# Patient Record
Sex: MALE | Race: WHITE | Employment: OTHER | URBAN - METROPOLITAN AREA
[De-identification: names, ages, dates, MRNs, and addresses within clinical notes are randomized per-mention and may not be internally consistent; named-entity substitution may affect disease eponyms.]

---

## 2018-09-14 ENCOUNTER — HOME HEALTH ADMISSION (OUTPATIENT)
Dept: HOME HEALTH SERVICES | Facility: HOME HEALTH | Age: 83
End: 2018-09-14
Payer: MEDICARE

## 2018-09-14 ENCOUNTER — HOME CARE VISIT (OUTPATIENT)
Dept: SCHEDULING | Facility: HOME HEALTH | Age: 83
End: 2018-09-14
Payer: MEDICARE

## 2018-09-14 VITALS
SYSTOLIC BLOOD PRESSURE: 120 MMHG | DIASTOLIC BLOOD PRESSURE: 64 MMHG | TEMPERATURE: 98.2 F | HEART RATE: 96 BPM | RESPIRATION RATE: 18 BRPM

## 2018-09-14 PROCEDURE — 3331090001 HH PPS REVENUE CREDIT

## 2018-09-14 PROCEDURE — 400013 HH SOC

## 2018-09-14 PROCEDURE — 3331090003 HH PPS REVENUE ADJ

## 2018-09-14 PROCEDURE — 3331090002 HH PPS REVENUE DEBIT

## 2018-09-14 PROCEDURE — G0151 HHCP-SERV OF PT,EA 15 MIN: HCPCS

## 2018-09-19 ENCOUNTER — HOME CARE VISIT (OUTPATIENT)
Dept: HOME HEALTH SERVICES | Facility: HOME HEALTH | Age: 83
End: 2018-09-19
Payer: MEDICARE

## 2018-10-01 ENCOUNTER — HOME HEALTH ADMISSION (OUTPATIENT)
Dept: HOME HEALTH SERVICES | Facility: HOME HEALTH | Age: 83
End: 2018-10-01
Payer: MEDICARE

## 2018-10-02 ENCOUNTER — HOME CARE VISIT (OUTPATIENT)
Dept: SCHEDULING | Facility: HOME HEALTH | Age: 83
End: 2018-10-02
Payer: MEDICARE

## 2018-10-02 VITALS
TEMPERATURE: 97.8 F | HEART RATE: 92 BPM | DIASTOLIC BLOOD PRESSURE: 70 MMHG | SYSTOLIC BLOOD PRESSURE: 140 MMHG | RESPIRATION RATE: 20 BRPM

## 2018-10-02 PROCEDURE — 3331090002 HH PPS REVENUE DEBIT

## 2018-10-02 PROCEDURE — 400013 HH SOC

## 2018-10-02 PROCEDURE — G0151 HHCP-SERV OF PT,EA 15 MIN: HCPCS

## 2018-10-02 PROCEDURE — 3331090001 HH PPS REVENUE CREDIT

## 2018-10-03 PROCEDURE — 3331090001 HH PPS REVENUE CREDIT

## 2018-10-03 PROCEDURE — 3331090002 HH PPS REVENUE DEBIT

## 2018-10-04 ENCOUNTER — HOME CARE VISIT (OUTPATIENT)
Dept: SCHEDULING | Facility: HOME HEALTH | Age: 83
End: 2018-10-04
Payer: MEDICARE

## 2018-10-04 VITALS
DIASTOLIC BLOOD PRESSURE: 80 MMHG | RESPIRATION RATE: 18 BRPM | HEART RATE: 50 BPM | TEMPERATURE: 98.2 F | SYSTOLIC BLOOD PRESSURE: 116 MMHG

## 2018-10-04 PROCEDURE — 3331090001 HH PPS REVENUE CREDIT

## 2018-10-04 PROCEDURE — 3331090002 HH PPS REVENUE DEBIT

## 2018-10-04 PROCEDURE — G0157 HHC PT ASSISTANT EA 15: HCPCS

## 2018-10-05 PROBLEM — R00.1 BRADYCARDIA: Status: ACTIVE | Noted: 2018-10-05

## 2018-10-05 PROCEDURE — 3331090001 HH PPS REVENUE CREDIT

## 2018-10-05 PROCEDURE — 3331090002 HH PPS REVENUE DEBIT

## 2018-10-06 PROCEDURE — 3331090002 HH PPS REVENUE DEBIT

## 2018-10-06 PROCEDURE — 3331090001 HH PPS REVENUE CREDIT

## 2018-10-07 PROCEDURE — 3331090002 HH PPS REVENUE DEBIT

## 2018-10-07 PROCEDURE — 3331090001 HH PPS REVENUE CREDIT

## 2018-10-08 PROCEDURE — 3331090001 HH PPS REVENUE CREDIT

## 2018-10-08 PROCEDURE — 3331090002 HH PPS REVENUE DEBIT

## 2018-10-09 ENCOUNTER — HOME CARE VISIT (OUTPATIENT)
Dept: SCHEDULING | Facility: HOME HEALTH | Age: 83
End: 2018-10-09
Payer: MEDICARE

## 2018-10-09 VITALS
DIASTOLIC BLOOD PRESSURE: 76 MMHG | RESPIRATION RATE: 18 BRPM | TEMPERATURE: 97.3 F | HEART RATE: 78 BPM | SYSTOLIC BLOOD PRESSURE: 138 MMHG

## 2018-10-09 PROCEDURE — 3331090001 HH PPS REVENUE CREDIT

## 2018-10-09 PROCEDURE — 3331090002 HH PPS REVENUE DEBIT

## 2018-10-09 PROCEDURE — G0157 HHC PT ASSISTANT EA 15: HCPCS

## 2018-10-10 ENCOUNTER — HOME CARE VISIT (OUTPATIENT)
Dept: SCHEDULING | Facility: HOME HEALTH | Age: 83
End: 2018-10-10
Payer: MEDICARE

## 2018-10-10 VITALS
HEART RATE: 87 BPM | DIASTOLIC BLOOD PRESSURE: 82 MMHG | RESPIRATION RATE: 16 BRPM | OXYGEN SATURATION: 97 % | SYSTOLIC BLOOD PRESSURE: 138 MMHG | TEMPERATURE: 97.9 F

## 2018-10-10 PROCEDURE — 3331090002 HH PPS REVENUE DEBIT

## 2018-10-10 PROCEDURE — G0152 HHCP-SERV OF OT,EA 15 MIN: HCPCS

## 2018-10-10 PROCEDURE — 3331090001 HH PPS REVENUE CREDIT

## 2018-10-11 ENCOUNTER — HOME CARE VISIT (OUTPATIENT)
Dept: SCHEDULING | Facility: HOME HEALTH | Age: 83
End: 2018-10-11
Payer: MEDICARE

## 2018-10-11 VITALS
SYSTOLIC BLOOD PRESSURE: 120 MMHG | HEART RATE: 82 BPM | TEMPERATURE: 97.4 F | DIASTOLIC BLOOD PRESSURE: 78 MMHG | RESPIRATION RATE: 18 BRPM

## 2018-10-11 PROCEDURE — 3331090001 HH PPS REVENUE CREDIT

## 2018-10-11 PROCEDURE — G0157 HHC PT ASSISTANT EA 15: HCPCS

## 2018-10-11 PROCEDURE — 3331090002 HH PPS REVENUE DEBIT

## 2018-10-12 PROCEDURE — 3331090001 HH PPS REVENUE CREDIT

## 2018-10-12 PROCEDURE — 3331090002 HH PPS REVENUE DEBIT

## 2018-10-13 PROCEDURE — 3331090001 HH PPS REVENUE CREDIT

## 2018-10-13 PROCEDURE — 3331090002 HH PPS REVENUE DEBIT

## 2018-10-14 PROCEDURE — 3331090001 HH PPS REVENUE CREDIT

## 2018-10-14 PROCEDURE — 3331090002 HH PPS REVENUE DEBIT

## 2018-10-15 PROCEDURE — 3331090001 HH PPS REVENUE CREDIT

## 2018-10-15 PROCEDURE — 3331090002 HH PPS REVENUE DEBIT

## 2018-10-16 ENCOUNTER — HOME CARE VISIT (OUTPATIENT)
Dept: SCHEDULING | Facility: HOME HEALTH | Age: 83
End: 2018-10-16
Payer: MEDICARE

## 2018-10-16 VITALS
SYSTOLIC BLOOD PRESSURE: 130 MMHG | TEMPERATURE: 97.9 F | RESPIRATION RATE: 18 BRPM | DIASTOLIC BLOOD PRESSURE: 80 MMHG | HEART RATE: 56 BPM

## 2018-10-16 PROCEDURE — 3331090002 HH PPS REVENUE DEBIT

## 2018-10-16 PROCEDURE — G0157 HHC PT ASSISTANT EA 15: HCPCS

## 2018-10-16 PROCEDURE — 3331090001 HH PPS REVENUE CREDIT

## 2018-10-17 PROCEDURE — 3331090001 HH PPS REVENUE CREDIT

## 2018-10-17 PROCEDURE — 3331090002 HH PPS REVENUE DEBIT

## 2018-10-18 ENCOUNTER — HOME CARE VISIT (OUTPATIENT)
Dept: SCHEDULING | Facility: HOME HEALTH | Age: 83
End: 2018-10-18
Payer: MEDICARE

## 2018-10-18 VITALS
RESPIRATION RATE: 18 BRPM | HEART RATE: 60 BPM | TEMPERATURE: 97.8 F | SYSTOLIC BLOOD PRESSURE: 140 MMHG | DIASTOLIC BLOOD PRESSURE: 62 MMHG

## 2018-10-18 PROCEDURE — G0157 HHC PT ASSISTANT EA 15: HCPCS

## 2018-10-18 PROCEDURE — 3331090001 HH PPS REVENUE CREDIT

## 2018-10-18 PROCEDURE — 3331090002 HH PPS REVENUE DEBIT

## 2018-10-19 PROCEDURE — 3331090002 HH PPS REVENUE DEBIT

## 2018-10-19 PROCEDURE — 3331090001 HH PPS REVENUE CREDIT

## 2018-10-20 PROCEDURE — 3331090002 HH PPS REVENUE DEBIT

## 2018-10-20 PROCEDURE — 3331090001 HH PPS REVENUE CREDIT

## 2018-10-21 PROCEDURE — 3331090002 HH PPS REVENUE DEBIT

## 2018-10-21 PROCEDURE — 3331090001 HH PPS REVENUE CREDIT

## 2018-10-22 ENCOUNTER — HOME CARE VISIT (OUTPATIENT)
Dept: SCHEDULING | Facility: HOME HEALTH | Age: 83
End: 2018-10-22
Payer: MEDICARE

## 2018-10-22 VITALS
HEART RATE: 62 BPM | RESPIRATION RATE: 22 BRPM | TEMPERATURE: 98.3 F | DIASTOLIC BLOOD PRESSURE: 76 MMHG | SYSTOLIC BLOOD PRESSURE: 134 MMHG

## 2018-10-22 PROCEDURE — G0151 HHCP-SERV OF PT,EA 15 MIN: HCPCS

## 2018-10-22 PROCEDURE — 3331090002 HH PPS REVENUE DEBIT

## 2018-10-22 PROCEDURE — 3331090001 HH PPS REVENUE CREDIT

## 2018-10-30 PROBLEM — I47.29 NSVT (NONSUSTAINED VENTRICULAR TACHYCARDIA): Status: ACTIVE | Noted: 2018-10-30

## 2019-01-08 ENCOUNTER — HOSPITAL ENCOUNTER (OUTPATIENT)
Dept: SLEEP MEDICINE | Age: 84
Discharge: HOME | End: 2019-01-08
Payer: MEDICARE

## 2019-01-08 PROCEDURE — 95811 POLYSOM 6/>YRS CPAP 4/> PARM: CPT

## 2019-01-10 ENCOUNTER — HOSPITAL ENCOUNTER (OUTPATIENT)
Dept: GENERAL RADIOLOGY | Age: 84
Discharge: HOME | End: 2019-01-10
Payer: MEDICARE

## 2019-01-10 DIAGNOSIS — J61 ASBESTOSIS (HCC): ICD-10-CM

## 2019-01-10 PROCEDURE — 71046 X-RAY EXAM CHEST 2 VIEWS: CPT

## 2019-02-18 ENCOUNTER — HOSPITAL ENCOUNTER (OUTPATIENT)
Dept: SLEEP MEDICINE | Age: 84
Discharge: HOME | End: 2019-02-18
Payer: MEDICARE

## 2019-02-18 PROCEDURE — 95811 POLYSOM 6/>YRS CPAP 4/> PARM: CPT

## 2019-02-26 PROBLEM — G47.33 OSA (OBSTRUCTIVE SLEEP APNEA): Status: ACTIVE | Noted: 2019-02-26

## 2019-02-26 PROBLEM — H91.90 HOH (HARD OF HEARING): Status: ACTIVE | Noted: 2019-02-26

## 2019-04-22 PROBLEM — Z78.9 DIFFICULTY WITH CPAP FULL FACE MASK USE: Status: ACTIVE | Noted: 2019-04-22

## 2019-04-26 PROBLEM — K86.2 PANCREATIC CYST: Status: ACTIVE | Noted: 2019-04-26

## 2019-10-16 ENCOUNTER — HOSPITAL ENCOUNTER (OUTPATIENT)
Dept: LAB | Age: 84
Discharge: HOME | End: 2019-10-16

## 2019-10-16 PROCEDURE — 88305 TISSUE EXAM BY PATHOLOGIST: CPT

## 2019-10-16 PROCEDURE — 88313 SPECIAL STAINS GROUP 2: CPT

## 2020-08-10 PROBLEM — I48.0 PAROXYSMAL ATRIAL FIBRILLATION (HCC): Status: ACTIVE | Noted: 2020-08-10

## 2020-10-26 PROBLEM — M17.10 ARTHRITIS OF KNEE: Status: ACTIVE | Noted: 2020-10-26

## 2020-11-10 PROBLEM — R22.31 MASS OF ARM, RIGHT: Status: ACTIVE | Noted: 2020-11-10

## 2021-01-08 ENCOUNTER — HOSPITAL ENCOUNTER (OUTPATIENT)
Age: 86
Setting detail: OBSERVATION
Discharge: HOME HEALTH | End: 2021-01-10
Attending: EMERGENCY MEDICINE | Admitting: HOSPITALIST
Payer: MEDICARE

## 2021-01-08 ENCOUNTER — APPOINTMENT (OUTPATIENT)
Dept: CT IMAGING | Age: 86
End: 2021-01-08
Attending: EMERGENCY MEDICINE
Payer: MEDICARE

## 2021-01-08 DIAGNOSIS — I63.9 ACUTE CVA (CEREBROVASCULAR ACCIDENT) (HCC): ICD-10-CM

## 2021-01-08 DIAGNOSIS — I10 HYPERTENSION, UNSPECIFIED TYPE: ICD-10-CM

## 2021-01-08 DIAGNOSIS — G45.9 TIA (TRANSIENT ISCHEMIC ATTACK): Primary | ICD-10-CM

## 2021-01-08 DIAGNOSIS — R47.01 EXPRESSIVE APHASIA: ICD-10-CM

## 2021-01-08 DIAGNOSIS — I48.91 ATRIAL FIBRILLATION, UNSPECIFIED TYPE (HCC): ICD-10-CM

## 2021-01-08 LAB
ANION GAP SERPL CALC-SCNC: 5 MMOL/L (ref 7–16)
ATRIAL RATE: 111 BPM
BASOPHILS # BLD: 0.1 K/UL (ref 0–0.2)
BASOPHILS NFR BLD: 1 % (ref 0–2)
BUN SERPL-MCNC: 17 MG/DL (ref 8–23)
CALCIUM SERPL-MCNC: 9.1 MG/DL (ref 8.3–10.4)
CALCULATED P AXIS, ECG09: 0 DEGREES
CALCULATED T AXIS, ECG11: -24 DEGREES
CHLORIDE SERPL-SCNC: 105 MMOL/L (ref 98–107)
CHOLEST SERPL-MCNC: 132 MG/DL
CO2 SERPL-SCNC: 29 MMOL/L (ref 21–32)
CREAT SERPL-MCNC: 1.16 MG/DL (ref 0.8–1.5)
DIAGNOSIS, 93000: NORMAL
DIFFERENTIAL METHOD BLD: ABNORMAL
EOSINOPHIL # BLD: 0.1 K/UL (ref 0–0.8)
EOSINOPHIL NFR BLD: 2 % (ref 0.5–7.8)
ERYTHROCYTE [DISTWIDTH] IN BLOOD BY AUTOMATED COUNT: 14.3 % (ref 11.9–14.6)
EST. AVERAGE GLUCOSE BLD GHB EST-MCNC: 111 MG/DL
GLUCOSE BLD STRIP.AUTO-MCNC: 119 MG/DL (ref 65–100)
GLUCOSE SERPL-MCNC: 117 MG/DL (ref 65–100)
HBA1C MFR BLD: 5.5 % (ref 4.2–6.3)
HCT VFR BLD AUTO: 42.1 % (ref 41.1–50.3)
HDLC SERPL-MCNC: 65 MG/DL (ref 40–60)
HDLC SERPL: 2 {RATIO}
HGB BLD-MCNC: 14.1 G/DL (ref 13.6–17.2)
IMM GRANULOCYTES # BLD AUTO: 0 K/UL (ref 0–0.5)
IMM GRANULOCYTES NFR BLD AUTO: 0 % (ref 0–5)
INR BLD: 1.4 (ref 0.9–1.2)
INR PPP: 1.3
LDLC SERPL CALC-MCNC: 57.4 MG/DL
LIPID PROFILE,FLP: ABNORMAL
LYMPHOCYTES # BLD: 1.8 K/UL (ref 0.5–4.6)
LYMPHOCYTES NFR BLD: 29 % (ref 13–44)
MCH RBC QN AUTO: 32.3 PG (ref 26.1–32.9)
MCHC RBC AUTO-ENTMCNC: 33.5 G/DL (ref 31.4–35)
MCV RBC AUTO: 96.3 FL (ref 79.6–97.8)
MONOCYTES # BLD: 0.5 K/UL (ref 0.1–1.3)
MONOCYTES NFR BLD: 8 % (ref 4–12)
NEUTS SEG # BLD: 3.7 K/UL (ref 1.7–8.2)
NEUTS SEG NFR BLD: 59 % (ref 43–78)
NRBC # BLD: 0 K/UL (ref 0–0.2)
PLATELET # BLD AUTO: 144 K/UL (ref 150–450)
PMV BLD AUTO: 11 FL (ref 9.4–12.3)
POTASSIUM SERPL-SCNC: 3.6 MMOL/L (ref 3.5–5.1)
PROTHROMBIN TIME: 16.3 SEC (ref 12.5–14.7)
PT BLD: 16 SECS (ref 9.6–11.6)
Q-T INTERVAL, ECG07: 274 MS
QRS DURATION, ECG06: 80 MS
QTC CALCULATION (BEZET), ECG08: 407 MS
RBC # BLD AUTO: 4.37 M/UL (ref 4.23–5.6)
SODIUM SERPL-SCNC: 139 MMOL/L (ref 138–145)
TRIGL SERPL-MCNC: 48 MG/DL (ref 35–150)
TROPONIN-HIGH SENSITIVITY: 14 PG/ML (ref 0–14)
TSH SERPL DL<=0.005 MIU/L-ACNC: 1.91 UIU/ML (ref 0.36–3.74)
VENTRICULAR RATE, ECG03: 133 BPM
VLDLC SERPL CALC-MCNC: 9.6 MG/DL (ref 6–23)
WBC # BLD AUTO: 6.2 K/UL (ref 4.3–11.1)

## 2021-01-08 PROCEDURE — 85610 PROTHROMBIN TIME: CPT

## 2021-01-08 PROCEDURE — C8929 TTE W OR WO FOL WCON,DOPPLER: HCPCS

## 2021-01-08 PROCEDURE — 96372 THER/PROPH/DIAG INJ SC/IM: CPT

## 2021-01-08 PROCEDURE — 84443 ASSAY THYROID STIM HORMONE: CPT

## 2021-01-08 PROCEDURE — 86580 TB INTRADERMAL TEST: CPT | Performed by: HOSPITALIST

## 2021-01-08 PROCEDURE — 99285 EMERGENCY DEPT VISIT HI MDM: CPT

## 2021-01-08 PROCEDURE — 80048 BASIC METABOLIC PNL TOTAL CA: CPT

## 2021-01-08 PROCEDURE — 93005 ELECTROCARDIOGRAM TRACING: CPT | Performed by: EMERGENCY MEDICINE

## 2021-01-08 PROCEDURE — 99205 OFFICE O/P NEW HI 60 MIN: CPT | Performed by: PSYCHIATRY & NEUROLOGY

## 2021-01-08 PROCEDURE — 2709999900 HC NON-CHARGEABLE SUPPLY

## 2021-01-08 PROCEDURE — 74011000250 HC RX REV CODE- 250: Performed by: EMERGENCY MEDICINE

## 2021-01-08 PROCEDURE — 80061 LIPID PANEL: CPT

## 2021-01-08 PROCEDURE — 83036 HEMOGLOBIN GLYCOSYLATED A1C: CPT

## 2021-01-08 PROCEDURE — 99218 HC RM OBSERVATION: CPT

## 2021-01-08 PROCEDURE — 74011250636 HC RX REV CODE- 250/636: Performed by: HOSPITALIST

## 2021-01-08 PROCEDURE — 65390000012 HC CONDITION CODE 44 OBSERVATION

## 2021-01-08 PROCEDURE — 74011000302 HC RX REV CODE- 302: Performed by: HOSPITALIST

## 2021-01-08 PROCEDURE — 82962 GLUCOSE BLOOD TEST: CPT

## 2021-01-08 PROCEDURE — 84484 ASSAY OF TROPONIN QUANT: CPT

## 2021-01-08 PROCEDURE — 70450 CT HEAD/BRAIN W/O DYE: CPT

## 2021-01-08 PROCEDURE — 85025 COMPLETE CBC W/AUTO DIFF WBC: CPT

## 2021-01-08 PROCEDURE — 74011000250 HC RX REV CODE- 250: Performed by: HOSPITALIST

## 2021-01-08 PROCEDURE — 96374 THER/PROPH/DIAG INJ IV PUSH: CPT

## 2021-01-08 PROCEDURE — 74011250637 HC RX REV CODE- 250/637: Performed by: HOSPITALIST

## 2021-01-08 RX ORDER — METOPROLOL TARTRATE 5 MG/5ML
5 INJECTION INTRAVENOUS ONCE
Status: CANCELLED | OUTPATIENT
Start: 2021-01-08 | End: 2021-01-09

## 2021-01-08 RX ORDER — SODIUM CHLORIDE 0.9 % (FLUSH) 0.9 %
5-40 SYRINGE (ML) INJECTION EVERY 8 HOURS
Status: DISCONTINUED | OUTPATIENT
Start: 2021-01-08 | End: 2021-01-10 | Stop reason: HOSPADM

## 2021-01-08 RX ORDER — GUAIFENESIN 100 MG/5ML
81 LIQUID (ML) ORAL DAILY
Status: DISCONTINUED | OUTPATIENT
Start: 2021-01-09 | End: 2021-01-09

## 2021-01-08 RX ORDER — METOPROLOL TARTRATE 25 MG/1
25 TABLET, FILM COATED ORAL 2 TIMES DAILY
Status: DISCONTINUED | OUTPATIENT
Start: 2021-01-08 | End: 2021-01-10 | Stop reason: HOSPADM

## 2021-01-08 RX ORDER — ACETAMINOPHEN 325 MG/1
650 TABLET ORAL
Status: DISCONTINUED | OUTPATIENT
Start: 2021-01-08 | End: 2021-01-10 | Stop reason: HOSPADM

## 2021-01-08 RX ORDER — ASPIRIN 81 MG/1
81 TABLET ORAL DAILY
Status: DISCONTINUED | OUTPATIENT
Start: 2021-01-09 | End: 2021-01-08 | Stop reason: SDUPTHER

## 2021-01-08 RX ORDER — ONDANSETRON 2 MG/ML
4 INJECTION INTRAMUSCULAR; INTRAVENOUS
Status: DISCONTINUED | OUTPATIENT
Start: 2021-01-08 | End: 2021-01-10 | Stop reason: HOSPADM

## 2021-01-08 RX ORDER — LABETALOL HYDROCHLORIDE 5 MG/ML
20 INJECTION, SOLUTION INTRAVENOUS
Status: COMPLETED | OUTPATIENT
Start: 2021-01-08 | End: 2021-01-08

## 2021-01-08 RX ORDER — METOPROLOL TARTRATE 5 MG/5ML
5 INJECTION INTRAVENOUS ONCE
Status: DISCONTINUED | OUTPATIENT
Start: 2021-01-08 | End: 2021-01-08

## 2021-01-08 RX ORDER — FINASTERIDE 5 MG/1
5 TABLET, FILM COATED ORAL DAILY
Status: DISCONTINUED | OUTPATIENT
Start: 2021-01-09 | End: 2021-01-10 | Stop reason: HOSPADM

## 2021-01-08 RX ORDER — SODIUM CHLORIDE 9 MG/ML
50 INJECTION, SOLUTION INTRAVENOUS CONTINUOUS
Status: DISPENSED | OUTPATIENT
Start: 2021-01-08 | End: 2021-01-09

## 2021-01-08 RX ORDER — ATORVASTATIN CALCIUM 80 MG/1
80 TABLET, FILM COATED ORAL DAILY
Status: DISCONTINUED | OUTPATIENT
Start: 2021-01-09 | End: 2021-01-10 | Stop reason: HOSPADM

## 2021-01-08 RX ORDER — CLOPIDOGREL BISULFATE 75 MG/1
300 TABLET ORAL DAILY
Status: DISCONTINUED | OUTPATIENT
Start: 2021-01-08 | End: 2021-01-08 | Stop reason: CLARIF

## 2021-01-08 RX ORDER — SODIUM CHLORIDE 0.9 % (FLUSH) 0.9 %
5-40 SYRINGE (ML) INJECTION AS NEEDED
Status: DISCONTINUED | OUTPATIENT
Start: 2021-01-08 | End: 2021-01-10 | Stop reason: HOSPADM

## 2021-01-08 RX ORDER — METOPROLOL TARTRATE 25 MG/1
25 TABLET, FILM COATED ORAL 2 TIMES DAILY
Status: CANCELLED | OUTPATIENT
Start: 2021-01-08

## 2021-01-08 RX ORDER — ENOXAPARIN SODIUM 100 MG/ML
40 INJECTION SUBCUTANEOUS EVERY 24 HOURS
Status: DISCONTINUED | OUTPATIENT
Start: 2021-01-08 | End: 2021-01-09

## 2021-01-08 RX ORDER — TAMSULOSIN HYDROCHLORIDE 0.4 MG/1
0.4 CAPSULE ORAL DAILY
Status: DISCONTINUED | OUTPATIENT
Start: 2021-01-09 | End: 2021-01-10 | Stop reason: HOSPADM

## 2021-01-08 RX ADMIN — TUBERCULIN PURIFIED PROTEIN DERIVATIVE 5 UNITS: 5 INJECTION, SOLUTION INTRADERMAL at 18:54

## 2021-01-08 RX ADMIN — METOPROLOL TARTRATE 25 MG: 25 TABLET, FILM COATED ORAL at 18:54

## 2021-01-08 RX ADMIN — LABETALOL HYDROCHLORIDE 20 MG: 5 INJECTION INTRAVENOUS at 11:12

## 2021-01-08 RX ADMIN — SODIUM CHLORIDE 50 ML/HR: 900 INJECTION, SOLUTION INTRAVENOUS at 18:55

## 2021-01-08 RX ADMIN — Medication 10 ML: at 22:11

## 2021-01-08 RX ADMIN — PERFLUTREN 1 ML: 6.52 INJECTION, SUSPENSION INTRAVENOUS at 13:50

## 2021-01-08 RX ADMIN — ENOXAPARIN SODIUM 40 MG: 40 INJECTION SUBCUTANEOUS at 22:14

## 2021-01-08 NOTE — Clinical Note
Status[de-identified] INPATIENT [101]   Type of Bed: Remote Telemetry [29]   Inpatient Hospitalization Certified Necessary for the Following Reasons: 9.  Other (further clarification in H&P documentation)   Admitting Diagnosis: TIA (transient ischemic attack) [801120]   Admitting Physician: [facility] [address], [facility]   Attending Physician: Ben Murray   Estimated Length of Stay: 2 Midnights   Discharge Plan[de-identified] Home with Office Follow-up

## 2021-01-08 NOTE — ED NOTES
TRANSFER - OUT REPORT:    Verbal report given to thalia kwan(name) on Kuldeep Alvarez  being transferred to 7th floor(unit) for routine progression of care       Report consisted of patients Situation, Background, Assessment and   Recommendations(SBAR). Information from the following report(s) SBAR, Kardex, ED Summary, Intake/Output, MAR and Recent Results was reviewed with the receiving nurse. Lines:   Peripheral IV 01/08/21 Left Antecubital (Active)   Site Assessment Clean, dry, & intact 01/08/21 1057   Phlebitis Assessment 0 01/08/21 1057   Infiltration Assessment 0 01/08/21 1057   Dressing Status Clean, dry, & intact 01/08/21 1057   Hub Color/Line Status Green 01/08/21 1057        Opportunity for questions and clarification was provided.       Patient transported with:  transport

## 2021-01-08 NOTE — ED PROVIDER NOTES
EMS reports patient with history of atrial fibrillation not on any blood thinner. Woke at 6:30 AM and had breakfast in his father-in-law's week. When his son-in-law came by around 8 he tried to speak with him and could not get his words out. This lasted for maybe a couple minutes. EMS was called out and patient brought here for possible TIA. On EMS arrival patient was symptom-free. Has remained symptom-free to the ER. Denies any headache or blurry vision. No Chest pain or shortness of breath. Is hypertensive with rapid A. fib in the ER room. The history is provided by the patient. No  was used. Aphasia  This is a new problem. Episode onset: 0800. The problem has been resolved. There was no focality noted. Primary symptoms include speech difficulty. Pertinent negatives include no focal weakness, no loss of sensation, no slurred speech, no movement disorder, no visual change, no mental status change, no unresponsiveness and no disorientation. There has been no fever. Pertinent negatives include no shortness of breath, no chest pain, no vomiting, no altered mental status, no confusion, no headaches, no nausea, no bowel incontinence and no bladder incontinence.         Past Medical History:   Diagnosis Date    Abnormal chest x-ray 2013    Lung shadow    Asbestos exposure 2013    Asbestos exposure     hx of as     Benign prostatic hypertrophy 2013    Hyperlipidemia 2013    Sleep apnea     Unspecified essential hypertension 2013       Past Surgical History:   Procedure Laterality Date    HX ORTHOPAEDIC      wrist fx    HX ORTHOPAEDIC      kirstin in femur screw in hip    HX OTHER SURGICAL      Bilateral wrist surgery    HX TONSILLECTOMY           Family History:   Problem Relation Age of Onset    Heart Attack Son 40         secondary to MI    Hypertension Mother     Hypertension Father     Diabetes Father     Stroke Father     Cancer Brother        Social History     Socioeconomic History    Marital status:      Spouse name: Not on file    Number of children: Not on file    Years of education: Not on file    Highest education level: Not on file   Occupational History    Not on file   Social Needs    Financial resource strain: Not on file    Food insecurity     Worry: Not on file     Inability: Not on file    Transportation needs     Medical: Not on file     Non-medical: Not on file   Tobacco Use    Smoking status: Never Smoker    Smokeless tobacco: Never Used   Substance and Sexual Activity    Alcohol use: Yes     Comment: 1 drink daily    Drug use: Not on file    Sexual activity: Not on file   Lifestyle    Physical activity     Days per week: Not on file     Minutes per session: Not on file    Stress: Not on file   Relationships    Social connections     Talks on phone: Not on file     Gets together: Not on file     Attends Anabaptism service: Not on file     Active member of club or organization: Not on file     Attends meetings of clubs or organizations: Not on file     Relationship status: Not on file    Intimate partner violence     Fear of current or ex partner: Not on file     Emotionally abused: Not on file     Physically abused: Not on file     Forced sexual activity: Not on file   Other Topics Concern    Not on file   Social History Narrative    Not on file         ALLERGIES: Patient has no known allergies. Review of Systems   Constitutional: Negative for chills and fever. HENT: Negative for rhinorrhea and sore throat. Eyes: Negative for pain, redness and visual disturbance. Respiratory: Negative for chest tightness, shortness of breath and wheezing. Cardiovascular: Negative for chest pain and leg swelling. Gastrointestinal: Negative for abdominal pain, bowel incontinence, diarrhea, nausea and vomiting. Genitourinary: Negative for bladder incontinence, dysuria and hematuria.    Musculoskeletal: Negative for back pain, gait problem, neck pain and neck stiffness. Skin: Negative for color change and rash. Neurological: Positive for speech difficulty. Negative for dizziness, focal weakness, facial asymmetry, weakness, numbness and headaches. Psychiatric/Behavioral: Negative for confusion. Vitals:    01/08/21 1046   BP: (!) 194/118   Pulse: (!) 108   Resp: 18   Temp: 98.7 °F (37.1 °C)   SpO2: 97%   Weight: 93.4 kg (206 lb)   Height: 5' 7\" (1.702 m)            Physical Exam  Constitutional:       Appearance: Normal appearance. He is well-developed. HENT:      Head: Normocephalic and atraumatic. Eyes:      Extraocular Movements: Extraocular movements intact. Pupils: Pupils are equal, round, and reactive to light. Neck:      Musculoskeletal: Normal range of motion and neck supple. Cardiovascular:      Rate and Rhythm: Normal rate and regular rhythm. Pulmonary:      Effort: Pulmonary effort is normal.      Breath sounds: Normal breath sounds. Abdominal:      General: Bowel sounds are normal.      Palpations: Abdomen is soft. Tenderness: There is no abdominal tenderness. Musculoskeletal: Normal range of motion. Skin:     General: Skin is warm and dry. Neurological:      Mental Status: He is alert and oriented to person, place, and time. Cranial Nerves: No cranial nerve deficit. Motor: Weakness (LLE drift) present. MDM  Number of Diagnoses or Management Options  Diagnosis management comments: Discussed with neuro and will hold off on CTA and CT brain perfusion. CT no acute. Will treat blood pressure and A fib and admit for further eval.     Blood pressure and heart rate improved. Will admit.         Amount and/or Complexity of Data Reviewed  Clinical lab tests: ordered and reviewed  Tests in the radiology section of CPT®: ordered and reviewed    Patient Progress  Patient progress: stable         Procedures        EKG: nonspecific ST and T waves changes, atrial fibrillation, rate 133. CT CODE NEURO HEAD WO CONTRAST (Final result)  Result time 01/08/21 11:12:10  Final result by Savanna Whitaker DO (01/08/21 11:12:10)                Impression:    Impression:     1. Findings most compatible with moderate chronic small vessel ischemic changes. 2. Otherwise unremarkable unenhanced CT scan of the brain. Narrative:    CT head without contrast     History: patient with acute neuro changes.  Left-sided weakness, aphasia     Technique: 5mm axial images were obtained from the skull base to the vertex   without contrast. Radiation dose reduction techniques were used for this study:   Our CT scanners use one or all of the following: Automated exposure control,   adjustment of the mA and/or kVp according to patient's size, iterative   reconstruction. Comparison: None. Findings: The ventricles and sulci are prominent but felt to be appropriate for   age. There are no extra-axial fluid collections. No evidence of acute   intraparenchymal hemorrhage or mass effect is identified. Patchy areas of   decreased attenuation are noted within the supratentorial white matter. These   are nonspecific findings but would be most compatible with moderate chronic   small vessel ischemic changes. There is no evidence to suggest an acute major   territorial infarct. The bony calvarium is intact.  The visualized mastoid air cells and paranasal   sinuses are well pneumatized and aerated.                          Results Include:    Recent Results (from the past 24 hour(s))   CBC WITH AUTOMATED DIFF    Collection Time: 01/08/21 10:52 AM   Result Value Ref Range    WBC 6.2 4.3 - 11.1 K/uL    RBC 4.37 4.23 - 5.6 M/uL    HGB 14.1 13.6 - 17.2 g/dL    HCT 42.1 41.1 - 50.3 %    MCV 96.3 79.6 - 97.8 FL    MCH 32.3 26.1 - 32.9 PG    MCHC 33.5 31.4 - 35.0 g/dL    RDW 14.3 11.9 - 14.6 %    PLATELET 766 (L) 909 - 450 K/uL    MPV 11.0 9.4 - 12.3 FL    ABSOLUTE NRBC 0.00 0.0 - 0.2 K/uL    DF AUTOMATED      NEUTROPHILS 59 43 - 78 %    LYMPHOCYTES 29 13 - 44 %    MONOCYTES 8 4.0 - 12.0 %    EOSINOPHILS 2 0.5 - 7.8 %    BASOPHILS 1 0.0 - 2.0 %    IMMATURE GRANULOCYTES 0 0.0 - 5.0 %    ABS. NEUTROPHILS 3.7 1.7 - 8.2 K/UL    ABS. LYMPHOCYTES 1.8 0.5 - 4.6 K/UL    ABS. MONOCYTES 0.5 0.1 - 1.3 K/UL    ABS. EOSINOPHILS 0.1 0.0 - 0.8 K/UL    ABS. BASOPHILS 0.1 0.0 - 0.2 K/UL    ABS. IMM.  GRANS. 0.0 0.0 - 0.5 K/UL   METABOLIC PANEL, BASIC    Collection Time: 01/08/21 10:52 AM   Result Value Ref Range    Sodium 139 138 - 145 mmol/L    Potassium 3.6 3.5 - 5.1 mmol/L    Chloride 105 98 - 107 mmol/L    CO2 29 21 - 32 mmol/L    Anion gap 5 (L) 7 - 16 mmol/L    Glucose 117 (H) 65 - 100 mg/dL    BUN 17 8 - 23 MG/DL    Creatinine 1.16 0.8 - 1.5 MG/DL    GFR est AA >60 >60 ml/min/1.73m2    GFR est non-AA >60 >60 ml/min/1.73m2    Calcium 9.1 8.3 - 10.4 MG/DL   PROTHROMBIN TIME + INR    Collection Time: 01/08/21 10:52 AM   Result Value Ref Range    Prothrombin time 16.3 (H) 12.5 - 14.7 sec    INR 1.3     TROPONIN-HIGH SENSITIVITY    Collection Time: 01/08/21 10:52 AM   Result Value Ref Range    Troponin-High Sensitivity 14.0 0 - 14 pg/mL   GLUCOSE, POC    Collection Time: 01/08/21 10:53 AM   Result Value Ref Range    Glucose (POC) 119 (H) 65 - 100 mg/dL   POC PT/INR    Collection Time: 01/08/21 10:54 AM   Result Value Ref Range    Prothrombin time (POC) 16.0 (H) 9.6 - 11.6 SECS    INR (POC) 1.4 (H) 0.9 - 1.2

## 2021-01-08 NOTE — ED TRIAGE NOTES
Pt arrives via ems from home with mask in place. Ems called out for aphasia. Daughter reported he had difficulty with expressing words. States it started at roughly 0800. Reports it lasted a couple of minutes. Daughter denied any slurred speech, facial droop or weakness. Ems reports [place] negative and race 0. afib en route 100-130 with hx of afib. bp 170s en route. bgl 197 with ems, afebrile at 98.8 and 97% ra.

## 2021-01-08 NOTE — H&P
INTERNAL MEDICINE H&P/CONSULT    Subjective:     81 yo male w/ hx of HTN, LAZ and atrial fibrillation not on any blood thinner. Woke at 6:30 AM and had breakfast in his father-in-law's week. When his son-in-law came by around 8 he tried to speak with him and could not get his words out. This lasted for maybe a couple minutes. EMS was called out and patient brought here for possible TIA. On EMS arrival patient was symptom-free. Has remained symptom-free to the ER. Denies any headache or blurry vision. No Chest pain or shortness of breath. Is hypertensive with rapid A. fib in the ER room. On further questioning, his symptom lasted about 1 hour and resolved. No other symptoms reported. He was seen in ED by Neurology. Past Medical History:   Diagnosis Date    Abnormal chest x-ray 5/6/2013    Lung shadow    Asbestos exposure 5/6/2013    Asbestos exposure     hx of as     Benign prostatic hypertrophy 5/6/2013    Hyperlipidemia 5/6/2013    Sleep apnea     Unspecified essential hypertension 5/6/2013      Past Surgical History:   Procedure Laterality Date    HX ORTHOPAEDIC      wrist fx    HX ORTHOPAEDIC      kirstin in femur screw in hip    HX OTHER SURGICAL      Bilateral wrist surgery    HX TONSILLECTOMY        Prior to Admission medications    Medication Sig Start Date End Date Taking? Authorizing Provider   OTHER Walker with wheels and a seat 10/26/20   Teagan Rodriguez MD   vit C/E/Zn/coppr/lutein/zeaxan (PRESERVISION AREDS-2 PO) Take  by mouth. Provider, Historical   metoprolol succinate (TOPROL-XL) 25 mg XL tablet Take 1 Tab by mouth two (2) times a day. 8/10/20   Pinkynile Castano DO   finasteride (PROSCAR) 5 mg tablet Take 1 Tab by mouth daily. 6/29/20   Blondell Reap, FNP   multivitamin (ONE A DAY) tablet Take 1 Tab by mouth daily.     Provider, Historical   tamsulosin (FLOMAX) 0.4 mg capsule TAKE 1 CAPSULE BY MOUTH EVERY DAY FOR 14 DAYS 5/27/20   Provider, Historical vitamin B complex (Super B-50 Complex) capsule     Provider, Historical   co-enzyme Q-10 (CO Q-10) 100 mg capsule  6/10/15   Provider, Historical   simvastatin (ZOCOR) 20 mg tablet Take 1 Tab by mouth nightly. 10/22/19   Hossein Rodriguez MD   aspirin delayed-release 81 mg tablet Take  by mouth daily. Provider, Historical   turmeric root extract 500 mg cap Take  by mouth. Provider, Historical   cholecalciferol (VITAMIN D3) 1,000 unit cap Take  by mouth daily. Provider, Historical   loratadine 10 mg cap Take  by mouth. Provider, Historical   Omega-3-DHA-EPA-Fish Oil 1,200 (144-216) mg cap Take 1 Cap by mouth three (3) times daily. Provider, Historical     No Known Allergies   Social History     Tobacco Use    Smoking status: Never Smoker    Smokeless tobacco: Never Used   Substance Use Topics    Alcohol use: Yes     Comment: 1 drink daily        Family History:  HTN    Review of Systems   A comprehensive review of systems was negative except for that written in the HPI. Objective: Intake / Output:  No intake/output data recorded. No intake/output data recorded. Physical Exam:  Visit Vitals  BP (!) 160/90   Pulse 75   Temp 98.7 °F (37.1 °C)   Resp 15   Ht 5' 7\" (1.702 m)   Wt 93.4 kg (206 lb)   SpO2 96%   BMI 32.26 kg/m²     General appearance: awake, alert, cooperative, obese, mild distress, appears stated age   Head: Normocephalic, without obvious abnormality, atraumatic  Back: symmetric, no curvature. ROM normal. No CVA tenderness. Lungs: clear to auscultation bilaterally   Heart: irregular irreg  Extremities: atraumatic, no cyanosis - Bilateral lower limbs edema none. Skin: No rashes or ulceration. Neurologic:  Cranial nerves 2-12 intact. Motor 5/5 in 4 limbs. No sensory deficits. No visual field defects. No dysmetria or nystagmus. DTR +2. Babiniski's reflex negative. Romberg's and gait tests are deferred at this time.     ECG: sinus rhythm     Data Review (Labs):   Recent Results (from the past 24 hour(s))   CBC WITH AUTOMATED DIFF    Collection Time: 01/08/21 10:52 AM   Result Value Ref Range    WBC 6.2 4.3 - 11.1 K/uL    RBC 4.37 4.23 - 5.6 M/uL    HGB 14.1 13.6 - 17.2 g/dL    HCT 42.1 41.1 - 50.3 %    MCV 96.3 79.6 - 97.8 FL    MCH 32.3 26.1 - 32.9 PG    MCHC 33.5 31.4 - 35.0 g/dL    RDW 14.3 11.9 - 14.6 %    PLATELET 975 (L) 056 - 450 K/uL    MPV 11.0 9.4 - 12.3 FL    ABSOLUTE NRBC 0.00 0.0 - 0.2 K/uL    DF AUTOMATED      NEUTROPHILS 59 43 - 78 %    LYMPHOCYTES 29 13 - 44 %    MONOCYTES 8 4.0 - 12.0 %    EOSINOPHILS 2 0.5 - 7.8 %    BASOPHILS 1 0.0 - 2.0 %    IMMATURE GRANULOCYTES 0 0.0 - 5.0 %    ABS. NEUTROPHILS 3.7 1.7 - 8.2 K/UL    ABS. LYMPHOCYTES 1.8 0.5 - 4.6 K/UL    ABS. MONOCYTES 0.5 0.1 - 1.3 K/UL    ABS. EOSINOPHILS 0.1 0.0 - 0.8 K/UL    ABS. BASOPHILS 0.1 0.0 - 0.2 K/UL    ABS. IMM.  GRANS. 0.0 0.0 - 0.5 K/UL   METABOLIC PANEL, BASIC    Collection Time: 01/08/21 10:52 AM   Result Value Ref Range    Sodium 139 138 - 145 mmol/L    Potassium 3.6 3.5 - 5.1 mmol/L    Chloride 105 98 - 107 mmol/L    CO2 29 21 - 32 mmol/L    Anion gap 5 (L) 7 - 16 mmol/L    Glucose 117 (H) 65 - 100 mg/dL    BUN 17 8 - 23 MG/DL    Creatinine 1.16 0.8 - 1.5 MG/DL    GFR est AA >60 >60 ml/min/1.73m2    GFR est non-AA >60 >60 ml/min/1.73m2    Calcium 9.1 8.3 - 10.4 MG/DL   PROTHROMBIN TIME + INR    Collection Time: 01/08/21 10:52 AM   Result Value Ref Range    Prothrombin time 16.3 (H) 12.5 - 14.7 sec    INR 1.3     TROPONIN-HIGH SENSITIVITY    Collection Time: 01/08/21 10:52 AM   Result Value Ref Range    Troponin-High Sensitivity 14.0 0 - 14 pg/mL   HEMOGLOBIN A1C WITH EAG    Collection Time: 01/08/21 10:52 AM   Result Value Ref Range    Hemoglobin A1c 5.5 4.20 - 6.30 %    Est. average glucose 111 mg/dL   LIPID PANEL    Collection Time: 01/08/21 10:52 AM   Result Value Ref Range    LIPID PROFILE          Cholesterol, total 132 <200 MG/DL    Triglyceride 48 35 - 150 MG/DL    HDL Cholesterol 65 (H) 40 - 60 MG/DL    LDL, calculated 57.4 <100 MG/DL    VLDL, calculated 9.6 6.0 - 23.0 MG/DL    CHOL/HDL Ratio 2.0     TSH 3RD GENERATION    Collection Time: 01/08/21 10:52 AM   Result Value Ref Range    TSH 1.910 0.358 - 3.740 uIU/mL   GLUCOSE, POC    Collection Time: 01/08/21 10:53 AM   Result Value Ref Range    Glucose (POC) 119 (H) 65 - 100 mg/dL   POC PT/INR    Collection Time: 01/08/21 10:54 AM   Result Value Ref Range    Prothrombin time (POC) 16.0 (H) 9.6 - 11.6 SECS    INR (POC) 1.4 (H) 0.9 - 1.2     EKG, 12 LEAD, INITIAL    Collection Time: 01/08/21 10:55 AM   Result Value Ref Range    Ventricular Rate 133 BPM    Atrial Rate 111 BPM    QRS Duration 80 ms    Q-T Interval 274 ms    QTC Calculation (Bezet) 407 ms    Calculated P Axis 0 degrees    Calculated T Axis -24 degrees    Diagnosis       !! AGE AND GENDER SPECIFIC ECG ANALYSIS !!  af with rvr with occasional Premature ventricular complexes  Confirmed by Corazon Walker MD (), REGGIE TANG ([id number]) on 1/8/2021 1:55:45 PM         Assessment:     1- TIA (transient ischemic attack) w/ expressive aphasia, resolved symptom, on home aspirin. 2- HTN, controlled  3- Hx of atrial fibrillation, uncontrolled on arrival, improved  4- LAZ and obesity    Plan:     Telemetry observation  ASA  Statin  Anticoagulation TBD per cardiology  PT/ OT consulted per policy  NPO per policy  IVF hydration  Blood pressure control  AM lab as needed  Follow MRI brain and echo  Continue essential home medications. Patient is full code. Further management depends on patient progress. Thank you for the oppourtinity to contribute in the care of your patient. Time 20 minutes.     Signed By: Davida Ruiz MD     January 8, 2021

## 2021-01-08 NOTE — DISCHARGE INSTRUCTIONS
Stroke: After Your Visit     Your Care Instructions      Risk factors for stroke include being overweight, smoking, and sedentary lifestyle. This means that the blood flow to a part of your brain was blocked for some time, which damages the nerve cells in that part of the brain. The part of your body controlled by that part of your brain may not function properly now. The brain is an amazing organ that can heal itself to some degree. The stroke you had damaged part of your brain, but other parts of your brain may take over in some way for the damaged areas. You have already started this process. Going home may be hard for you and your family. The more you can try to do for yourself, the better. Remember to take each day one at a time. Follow-up care is a key part of your treatment and safety. Be sure to make and go to all appointments, and call your doctor if you are having problems. Its also a good idea to know your test results and keep a list of the medicines you take. How can you care for yourself at home? Enter a stroke rehabilitation (rehab) program, if your doctor recommends it. Physical, speech, and occupational therapies can help you manage bathing, dressing, eating, and other basics of daily living. Eat a heart-healthy diet that is low in cholesterol, saturated fat, and salt. Eat lots of fresh fruits and vegetables and foods high in fiber. Increase your activities slowly. Take short rest breaks when you get tired. Gradually increase the amount you walk. Start out by walking a little more than you did the day before. Do not drive until your doctor says it is okay. It is normal to feel sad or depressed after a stroke. If the blues last, talk to your doctor. If you are having problems with urine leakage, go to the bathroom at regular times, including when you first wake up and at bedtime. Also, limit fluids after dinner.   If you are constipated, drink plenty of fluids, enough so that your urine is light yellow or clear like water. If you have kidney, heart, or liver disease and have to limit fluids, talk with your doctor before you increase the amount of fluids you drink. Set up a regular time for using the toilet. If you continue to have constipation, your doctor may suggest using a bulking agent, such as Metamucil, or a stool softener, laxative, or enema. Medicines  Take your medicines exactly as prescribed. Call your doctor if you think you are having a problem with your medicine. You may be taking several medicines. ACE (angiotensin-converting enzyme) inhibitors, angiotensin II receptor blockers (ARBs), beta-blockers, diuretics (water pills), and calcium channel blockers control your blood pressure. Statins help lower cholesterol. Your doctor may also prescribe medicines for depression, pain, sleep problems, anxiety, or agitation. If your doctor has given you medicine that prevents blood clots, such as warfarin (Coumadin), aspirin combined with extended-release dipyridamole (Aggrenox), clopidogrel (Plavix), or aspirin to prevent another stroke, you should:  Tell your dentist, pharmacist, and other health professionals that you take these medicines. Watch for unusual bruising or bleeding, such as blood in your urine, red or black stools, or bleeding from your nose or gums. Get regular blood tests to check your clotting time if you are taking Coumadin. Wear medical alert jewelry that says you take blood thinners. You can buy this at most drugstores. Do not take any over-the-counter medicines or herbal products without talking to your doctor first.  If you take birth control pills or hormone replacement therapy, talk to your doctor about whether they are right for you. For family members and caregivers  Make the home safe. Set up a room so that your loved one does not have to climb stairs. Be sure the bathroom is on the same floor.  Move throw rugs and furniture that could cause falls, and make sure that the lighting is good. Put grab bars and seats in tubs and showers. Find out what your loved one can do and what he or she needs help with. Try not to do things for your loved one that your loved one can do on his or her own. Help him or her learn and practice new skills. Visit and talk with your loved one often. Try doing activities together that you both enjoy, such as playing cards or board games. Keep in touch with your loved one's friends as much as you can, and encourage them to visit. Take care of yourself. Do not try to do everything yourself. Ask other family members to help. Eat well, get enough rest, and take time to do things that you enjoy. Keep up with your own doctor visits, and make sure to take your medicines regularly. Get out of the house as much as you can. Join a local support group. Find out if you qualify for home health care visits to help with rehab or for adult day care. When should you call for help? Call 911 anytime you think you may need emergency care. For example, call if:  You have signs of another stroke. These may include:  Sudden numbness, paralysis, or weakness in your face, arm, or leg, especially on only one side of your body. New problems with walking or balance. Sudden vision changes. Drooling or slurred speech. New problems speaking or understanding simple statements, or you feel confused. A sudden, severe headache that is different from past headaches. Call 911 even if these symptoms go away in a few minutes. You cough up blood. You vomit blood or what looks like coffee grounds. You pass maroon or very bloody stools. Call your doctor now or seek immediate medical care if:  You have new bruises or blood spots under your skin. You have a nosebleed. Your gums bleed when you brush your teeth. You have blood in your urine. Your stools are black and tarlike or have streaks of blood.   You have vaginal bleeding when you are not having your period, or heavy period bleeding. You have new symptoms that may be related to your stroke, such as falls or trouble swallowing. Watch closely for changes in your health, and be sure to contact your doctor if you have any problems. Where can you learn more? Go to [employer]    Enter C294  in the search box to learn more about \"Stroke: After Your Visit\". © 8966-2539 Healthwise, Incorporated. Care instructions adapted under license by [facility] (which disclaims liability or warranty for this information). This care instruction is for use with your licensed healthcare professional. If you have questions about a medical condition or this instruction, always ask your healthcare professional. [employer] any warranty or liability for your use of this information.

## 2021-01-08 NOTE — CONSULTS
Consult    Patient: John Olsen MRN: [id number]     YOB: 1931  Age: 80 y.o. Sex: male      Subjective:      John Olsen is a 80 y.o. male who is being seen for code S. The patient was last known normal at 8 AM.  The patient had a sudden change in language. He was having difficulty finding words. The patient presented to [facility] ED. A code S was called at 10:51 AM. Neurology arrived to the bedside at 10:55 AM. Initial NIHSS was 0. A CT of the head was obtained and does not show acute pathology. The patient feels back to his neurological baseline. He is in atrial fibrillation with RVR. Past Medical History:   Diagnosis Date    Abnormal chest x-ray 2013    Lung shadow    Asbestos exposure 2013    Asbestos exposure     hx of as     Benign prostatic hypertrophy 2013    Hyperlipidemia 2013    Sleep apnea     Unspecified essential hypertension 2013     Past Surgical History:   Procedure Laterality Date    HX ORTHOPAEDIC      wrist fx    HX ORTHOPAEDIC      kirstin in femur screw in hip    HX OTHER SURGICAL      Bilateral wrist surgery    HX TONSILLECTOMY        Family History   Problem Relation Age of Onset    Heart Attack Son 40         secondary to MI    Hypertension Mother     Hypertension Father     Diabetes Father     Stroke Father     Cancer Brother      Social History     Tobacco Use    Smoking status: Never Smoker    Smokeless tobacco: Never Used   Substance Use Topics    Alcohol use: Yes     Comment: 1 drink daily      Current Outpatient Medications   Medication Sig Dispense Refill    OTHER Walker with wheels and a seat 1 Tab 0    vit C/E/Zn/coppr/lutein/zeaxan (PRESERVISION AREDS-2 PO) Take  by mouth.  metoprolol succinate (TOPROL-XL) 25 mg XL tablet Take 1 Tab by mouth two (2) times a day. 180 Tab 3    finasteride (PROSCAR) 5 mg tablet Take 1 Tab by mouth daily.  90 Tab 3    multivitamin (ONE A DAY) tablet Take 1 Tab by mouth daily.  tamsulosin (FLOMAX) 0.4 mg capsule TAKE 1 CAPSULE BY MOUTH EVERY DAY FOR 14 DAYS      vitamin B complex (Super B-50 Complex) capsule       co-enzyme Q-10 (CO Q-10) 100 mg capsule       simvastatin (ZOCOR) 20 mg tablet Take 1 Tab by mouth nightly. 90 Tab 3    aspirin delayed-release 81 mg tablet Take  by mouth daily.  turmeric root extract 500 mg cap Take  by mouth.  cholecalciferol (VITAMIN D3) 1,000 unit cap Take  by mouth daily.  loratadine 10 mg cap Take  by mouth.  Omega-3-DHA-EPA-Fish Oil 1,200 (144-216) mg cap Take 1 Cap by mouth three (3) times daily. No Known Allergies    Review of Systems:  Not obtained due to emergent situation         Objective:     Vitals:    01/08/21 1046 01/08/21 1054 01/08/21 1112   BP: (!) 194/118 (!) 213/114 (!) 200/155   Pulse: (!) 108 (!) 130    Resp: 18 25    Temp: 98.7 °F (37.1 °C)     SpO2: 97% 97%    Weight: 206 lb (93.4 kg)     Height: 5' 7\" (1.702 m)          Physical Exam:  General - Well developed, well nourished, in no apparent distress. Pleasant and conversant. HEENT - Normocephalic, atraumatic. Conjunctiva, tympanic membranes, and oropharynx are clear. Neck - Supple without masses, no bruits   Cardiovascular - Regular rate and rhythm. Normal S1, S2 without murmurs, rubs, or gallops. Lungs - Clear to auscultation. Abdomen - Soft, nontender with normal bowel sounds. Extremities - Peripheral pulses intact. No edema and no rashes. Neurological examination - Comprehension, attention, memory and reasoning are intact. Language and speech are normal.   On cranial nerve examination, (II, III, IV, VI) pupils are equal, round, and reactive to light. Fundoscopic exam is normal. Visual acuity is adequate. Visual fields are full to finger confrontation. Extraocular motility is normal. (V, VII) Face is symmetric and sensation is intact to light touch. (VIII) Hearing is very poor.  (IX, X) Palate and uvula elevate symmetrically. Voice is normal. (XI) Shoulder shrug is strong and equal bilaterally. (XII)Tongue is midline. Motor examination - There is normal muscle tone and bulk. Power is full throughout. Muscle stretch reflexes are normoactive and there are no pathological reflexes present. Plantar response is flexor. Sensation is intact to light touch, pinprick, vibration and proprioception in all extremities. Cerebellar examination is normal. Gait and stance are normal.     NIHSS   NIHSS Score: 0  1a-Level of Consciousness 0  1b-What is Month/Age 0  1c-Open/Close Eyes&Hand 0  2 -Best Gaze 0  3 -Visual Fields 0  4 -Facial Palsy 0  5a-Motor-Left Arm 0  5b-Motor-Right Arm 0  6a-Motor-Left Leg 0  6b-Motor-Right Leg 0  7 -Limb Ataxia 0  8 -Sensory 0  9 -Best Language 0  10-Dysarthria 0  11-Extinction/Inattention 0    Lab Results   Component Value Date/Time    Cholesterol, total 104 10/26/2020 11:06 AM    HDL Cholesterol 63 10/26/2020 11:06 AM    LDL, calculated 31 10/26/2020 11:06 AM    LDL, calculated 52 10/22/2019 04:00 PM    VLDL, calculated 10 10/26/2020 11:06 AM    VLDL, calculated 10 10/22/2019 04:00 PM    Triglyceride 33 10/26/2020 11:06 AM        No results found for: HBA1C, HGBE8, DHI6ZPAQ, LUV0HYUF     Images personally reviewed by me   CT Results (most recent):  Results from Hospital Encounter encounter on 01/08/21   CT CODE NEURO HEAD WO CONTRAST    Narrative CT head without contrast    History: patient with acute neuro changes. Left-sided weakness, aphasia    Technique: 5mm axial images were obtained from the skull base to the vertex  without contrast. Radiation dose reduction techniques were used for this study:   Our CT scanners use one or all of the following: Automated exposure control,  adjustment of the mA and/or kVp according to patient's size, iterative  reconstruction. Comparison: None. Findings: The ventricles and sulci are prominent but felt to be appropriate for  age.  There are no extra-axial fluid collections. No evidence of acute  intraparenchymal hemorrhage or mass effect is identified. Patchy areas of  decreased attenuation are noted within the supratentorial white matter. These  are nonspecific findings but would be most compatible with moderate chronic  small vessel ischemic changes. There is no evidence to suggest an acute major  territorial infarct. The bony calvarium is intact. The visualized mastoid air cells and paranasal  sinuses are well pneumatized and aerated. Impression Impression:    1. Findings most compatible with moderate chronic small vessel ischemic changes. 2. Otherwise unremarkable unenhanced CT scan of the brain. Assessment:     40-year-old man seen as a code S with transient expressive aphasia in the setting of atrial fibrillation with RVR. Initial NIHSS was 0. CT of the head was obtained and showed chronic changes. CTA of head neck was not obtained due to resolved symptoms. He was not a TPA candidate because of resolved symptoms. Likely TIA in the setting of atrial fibrillation with RVR. ABCD2 Score  --------------------------------    Age 61 or more (1 point) 1    Blood Pressure 140/90 or more (1 point) 1    Clinical:   Unilateral Weakness (2 point) 0  Speech Impairment (1 point) 1    Duration:  60 minutes or more (2 points) 0  <60 minutes (1 point)   <10 minutes - 0 points     Diabetes mellitus (1 point) 0    ABCD2 score: 3 (low risk) but likely inaccurate given atrial fibrillation with RVR. Range (0-7)           Plan:     Continue aspirin for now. He follows with cardiology as an outpatient. Defer to cardiology regarding eligibility for anticoagulation. Consider admission with TIA and A. fib with RVR. MRI brain to investigate small embolic infarcts    High intensity statin prior to discharge    Permissive hypertension to a blood pressure of 220/120 is typically permitted in the setting of stroke. Considering his other medical conditions, I would treat blood pressure greater than 778 mmHg systolic. Echocardiogram    High risk for stroke         Signed By: Nargis Rodarte DO     January 8, 2021      .

## 2021-01-09 ENCOUNTER — APPOINTMENT (OUTPATIENT)
Dept: MRI IMAGING | Age: 86
End: 2021-01-09
Attending: HOSPITALIST
Payer: MEDICARE

## 2021-01-09 ENCOUNTER — APPOINTMENT (OUTPATIENT)
Dept: CT IMAGING | Age: 86
End: 2021-01-09
Attending: NURSE PRACTITIONER
Payer: MEDICARE

## 2021-01-09 LAB
CHOLEST SERPL-MCNC: 119 MG/DL
HDLC SERPL-MCNC: 57 MG/DL (ref 40–60)
HDLC SERPL: 2.1 {RATIO}
LDLC SERPL CALC-MCNC: 55 MG/DL
LIPID PROFILE,FLP: NORMAL
MM INDURATION POC: 0 MM (ref 0–5)
PPD POC: NEGATIVE NEGATIVE
TRIGL SERPL-MCNC: 35 MG/DL (ref 35–150)
VLDLC SERPL CALC-MCNC: 7 MG/DL (ref 6–23)

## 2021-01-09 PROCEDURE — 97110 THERAPEUTIC EXERCISES: CPT

## 2021-01-09 PROCEDURE — 99232 SBSQ HOSP IP/OBS MODERATE 35: CPT | Performed by: PSYCHIATRY & NEUROLOGY

## 2021-01-09 PROCEDURE — 70551 MRI BRAIN STEM W/O DYE: CPT

## 2021-01-09 PROCEDURE — 74011000258 HC RX REV CODE- 258: Performed by: INTERNAL MEDICINE

## 2021-01-09 PROCEDURE — 74011250637 HC RX REV CODE- 250/637: Performed by: INTERNAL MEDICINE

## 2021-01-09 PROCEDURE — 99221 1ST HOSP IP/OBS SF/LOW 40: CPT | Performed by: INTERNAL MEDICINE

## 2021-01-09 PROCEDURE — 74011250637 HC RX REV CODE- 250/637: Performed by: HOSPITALIST

## 2021-01-09 PROCEDURE — 97165 OT EVAL LOW COMPLEX 30 MIN: CPT

## 2021-01-09 PROCEDURE — 36415 COLL VENOUS BLD VENIPUNCTURE: CPT

## 2021-01-09 PROCEDURE — 99218 HC RM OBSERVATION: CPT

## 2021-01-09 PROCEDURE — 92610 EVALUATE SWALLOWING FUNCTION: CPT

## 2021-01-09 PROCEDURE — 80061 LIPID PANEL: CPT

## 2021-01-09 PROCEDURE — 97161 PT EVAL LOW COMPLEX 20 MIN: CPT

## 2021-01-09 PROCEDURE — 74011000636 HC RX REV CODE- 636: Performed by: INTERNAL MEDICINE

## 2021-01-09 PROCEDURE — 70496 CT ANGIOGRAPHY HEAD: CPT

## 2021-01-09 RX ORDER — SODIUM CHLORIDE 0.9 % (FLUSH) 0.9 %
10 SYRINGE (ML) INJECTION
Status: COMPLETED | OUTPATIENT
Start: 2021-01-09 | End: 2021-01-09

## 2021-01-09 RX ADMIN — Medication 10 ML: at 09:55

## 2021-01-09 RX ADMIN — ASPIRIN 81 MG: 81 TABLET, CHEWABLE ORAL at 09:18

## 2021-01-09 RX ADMIN — Medication 5 ML: at 19:56

## 2021-01-09 RX ADMIN — ACETAMINOPHEN 650 MG: 325 TABLET, FILM COATED ORAL at 02:00

## 2021-01-09 RX ADMIN — METOPROLOL TARTRATE 25 MG: 25 TABLET, FILM COATED ORAL at 09:17

## 2021-01-09 RX ADMIN — APIXABAN 5 MG: 5 TABLET, FILM COATED ORAL at 19:56

## 2021-01-09 RX ADMIN — FINASTERIDE 5 MG: 5 TABLET, FILM COATED ORAL at 09:18

## 2021-01-09 RX ADMIN — TAMSULOSIN HYDROCHLORIDE 0.4 MG: 0.4 CAPSULE ORAL at 09:18

## 2021-01-09 RX ADMIN — METOPROLOL TARTRATE 25 MG: 25 TABLET, FILM COATED ORAL at 18:31

## 2021-01-09 RX ADMIN — IOPAMIDOL 50 ML: 755 INJECTION, SOLUTION INTRAVENOUS at 09:55

## 2021-01-09 RX ADMIN — Medication 10 ML: at 16:19

## 2021-01-09 RX ADMIN — Medication 10 ML: at 06:00

## 2021-01-09 RX ADMIN — SODIUM CHLORIDE 100 ML: 900 INJECTION, SOLUTION INTRAVENOUS at 09:55

## 2021-01-09 NOTE — PROGRESS NOTES
ACUTE PHYSICAL THERAPY GOALS:  (Developed with and agreed upon by patient and/or caregiver.)  (1.)Pt will move from supine to sit and sit to supine , scoot up and down and roll side to side with SUPERVISION within 7 treatment day(s). (2.)Pt will transfer from bed to chair and chair to bed with SUPERVISION using the least restrictive device within 7 treatment day(s). (3.)Pt will ambulate with SUPERVISION for 15 feet with the least restrictive device within 7 treatment day(s), O2 stats >90%. (4.)Pt will perform static sitting EOB x 5 min with SBA within 7 treatment days for increased trunk control. (5.)Pt will participate in 23+ minutes of therapeutic activity within 7 treatment days for increased activity tolerance. (6.)Pt will perform sit to stand transfer at bedside with CGA within 7 days for preparation for transfers and ambulation.       PHYSICAL THERAPY ASSESSMENT: Initial Assessment PT Treatment Day # 1      Rajesh Macias is a 80 y.o. male   PRIMARY DIAGNOSIS: TIA (transient ischemic attack)  TIA (transient ischemic attack) [G45.9]       Reason for Referral:  ICD-10: Treatment Diagnosis: Difficulty in walking, Not elsewhere classified (R26.2)  Other abnormalities of gait and mobility (R26.89)     OBSERVATION: Payor: [employer] MEDICARE / Plan: [facility] [employer] MEDICARE CHOICE PPO/PFFS / Product Type: Managed Care Medicare /     ASSESSMENT:     REHAB RECOMMENDATIONS:   Recommendation to date pending progress:  Setting:   Outpatient Therapy  Equipment:    Rollator walker     PRIOR LEVEL OF FUNCTION:  (Prior to Hospitalization) INITIAL/CURRENT LEVEL OF FUNCTION:  (Most Recently Demonstrated)   Bed Mobility:   Modified Independent  Sit to Stand:   Modified Independent  Transfers:   Modified Independent  Gait/Mobility:   Modified Independent Bed Mobility:   Standby Assistance  Sit to Stand:  [email or web address] Assistance  Transfers:   Contact Guard Assistance  Gait/Mobility:  [facility] ASSESSMENT:  Mr. Nadir Koroma requires CGA and VC during ambulation for walker navigation, posture, and pacing. Slow and requires rest breaks but able to complete. Tires easily. Would benefit from skilled PT to address deficits, work on posture and stamina, and restore PLOF. SUBJECTIVE:   Mr. Nadir Koroma states, \"I'm feeling very tired. \"    SOCIAL HISTORY/LIVING ENVIRONMENT:   Home Environment: Private residence  # Steps to Enter: 0  One/Two Story Residence: One story  Living Alone: No  Support Systems: Family member(s)  OBJECTIVE:     PAIN: VITAL SIGNS: LINES/DRAINS:   Pre Treatment: Pain Screen  Pain Scale 1: Numeric (0 - 10)  Pain Intensity 1: 0  Post Treatment: 0   none  O2 Device: Room air     GROSS EVALUATION:   Within Functional Limits Abnormal/ Functional Abnormal/ Non-Functional (see comments) Not Tested Comments:   AROM [x] [] [] []    PROM [] [] [] [x]    Strength [] [x] [] []    Balance [] [x] [] []    Posture [] [x] [] []    Sensation [x] [] [] []    Coordination [x] [] [] []    Tone [x] [] [] []    Edema [x] [] [] []    Activity Tolerance [] [x] [] []     [] [] [] []      COGNITION/  PERCEPTION: Intact Impaired   (see comments) Comments:   Orientation [x] []    Vision [x] []    Hearing [] [x] Wears hearing aids and has difficulty with them   Command Following [x] []    Safety Awareness [] [x] Pt not very attentive when ambulating with walker    [] []      MOBILITY: I Mod I S SBA CGA Min Mod Max Total  NT x2 Comments:   Bed Mobility    Rolling [] [] [] [x] [] [] [] [] [] [] []    Supine to Sit [] [] [] [x] [] [] [] [] [] [] []    Scooting [x] [] [] [] [] [] [] [] [] [] []    Sit to Supine [x] [] [] [] [] [] [] [] [] [] []    Transfers    Sit to Stand [] [] [] [x] [] [] [] [] [] [] []    Bed to Chair [] [] [] [x] [] [] [] [] [] [] []    Stand to Sit [x] [] [] [] [] [] [] [] [] [] []    I=Independent, Mod I=Modified Independent, S=Supervision, SBA=Standby Assistance, CGA=Contact Guard Assistance, Min=Minimal Assistance, Mod=Moderate Assistance, Max=Maximal Assistance, Total=Total Assistance, NT=Not Tested  GAIT: I Mod I S SBA CGA Min Mod Max Total  NT x2 Comments:   Level of Assistance [] [] [] [] [x] [] [] [] [] [] []    Distance 75ft    DME Rolling Walker and Gait Belt    Gait Quality Decreased step knee flexion and foot clearance    Weightbearing Status No restrictions     I=Independent, Mod I=Modified Independent, S=Supervision, SBA=Standby Assistance, CGA=Contact Guard Assistance,   Min=Minimal Assistance, Mod=Moderate Assistance, Max=Maximal Assistance, Total=Total Assistance, NT=Not Tested    [place] Form       How much difficulty does the patient currently have. .. Unable A Lot A Little None   1. Turning over in bed (including adjusting bedclothes, sheets and blankets)? [] 1   [] 2   [] 3   [x] 4   2. Sitting down on and standing up from a chair with arms ( e.g., wheelchair, bedside commode, etc.)   [] 1   [] 2   [] 3   [x] 4   3. Moving from lying on back to sitting on the side of the bed? [] 1   [] 2   [] 3   [x] 4   How much help from another person does the patient currently need. .. Total A Lot A Little None   4. Moving to and from a bed to a chair (including a wheelchair)? [] 1   [] 2   [x] 3   [] 4   5. Need to walk in hospital room? [] 1   [] 2   [x] 3   [] 4   6. Climbing 3-5 steps with a railing? [] 1   [x] 2   [] 3   [] 4   © 2007, Trustees of [facility] [employer], under license to [employer]. All rights reserved     Score:  Initial: 20 Most Recent: X (Date: -- )    Interpretation of Tool:  Represents activities that are increasingly more difficult (i.e. Bed mobility, Transfers, Gait).     PLAN:   FREQUENCY/DURATION: PT Plan of Care: 3 times/week for duration of hospital stay or until stated goals are met, whichever comes first.    PROBLEM LIST:   (Skilled intervention is medically necessary to address:)  1. Decreased ADL/Functional Activities  2. Decreased Activity Tolerance  3. Decreased Balance  4. Decreased Gait Ability  5. Decreased Strength   INTERVENTIONS PLANNED:   (Benefits and precautions of physical therapy have been discussed with the patient.)  1. Therapeutic Activity  2. Therapeutic Exercise/HEP  3. Neuromuscular Re-education  4. Gait Training  5. Manual Therapy  6. Education     TREATMENT:     EVALUATION: Low Complexity : (Untimed Charge)    TREATMENT:   (     )  Therapeutic Exercise (8 Minutes): Therapeutic exercises noted below to improve functional activity tolerance, AROM, strength and mobility.   Amb 100ft   Date:  1/8/2021 Date:   Date:     Activity/Exercise Parameters Parameters Parameters   ambulation 100ft                                             AFTER TREATMENT POSITION/PRECAUTIONS:  Chair, Needs within reach and RN notified    INTERDISCIPLINARY COLLABORATION:  RN/PCT    TOTAL TREATMENT DURATION:  PT Patient Time In/Time Out  Time In: 1021  Time Out: [facility], PT, DPT

## 2021-01-09 NOTE — PROGRESS NOTES
Neurology Daily Progress Note     Assessment:     Probable TIA. Code S presented with transient expressive aphasia in the setting of A.fib with RVR. TTE EF 55-60%, moderate to marked LAD, negative for PFO. He is back to his neurological baseline. MRI of brain and CTA of head/neck pending. Plan:     · Continue ASA 81 mg daily. He follows with cardiology as an outpatient. Defer to cardiology regarding eligibility for anticoagulation. · Initiate high intensity statin   · Neurochecks Q4H  · MRI of brain pending  · CTA of head and neck  pending  · Telemetry  · PT/OT/ST  · DVT prophylaxis   · BP management - normotensive, with long-term goal <140/90  · Smoking cessation if applicable   · Diabetes education if applicable   · Depression Screening prior to discharge      Subjective: Interval history:    No focal deficits, he is back to his neurological baseline. MRI, CTA pending. History:    Bárbara Ashford is a 80 y.o. male who is being seen for Code S presented with transient expressive aphasia. Review of systems negative with exception of pertinent positives and negatives noted above.        Objective:     Vitals:    01/08/21 1714 01/08/21 2000 01/09/21 0000 01/09/21 0400   BP: (!) 156/93 (!) 178/103 107/66 117/68   Pulse: 76 91 75 65   Resp: 11 16 16 16   Temp:  97.4 °F (36.3 °C) 97.4 °F (36.3 °C) 97.9 °F (36.6 °C)   SpO2: 96% 95% 95% 97%   Weight:       Height:              Current Facility-Administered Medications:     aspirin chewable tablet 81 mg, 81 mg, Oral, DAILY, Jaye Case MD, 81 mg at 01/09/21 2204    tamsulosin (FLOMAX) capsule 0.4 mg, 0.4 mg, Oral, DAILY, Sidney Martell MD, 0.4 mg at 01/09/21 3349    finasteride (PROSCAR) tablet 5 mg, 5 mg, Oral, DAILY, Sidney Martell MD, 5 mg at 01/09/21 0691    atorvastatin (LIPITOR) tablet 80 mg, 80 mg, Oral, DAILY, Sidney Martell MD    sodium chloride (NS) flush 5-40 mL, 5-40 mL, IntraVENous, Q8H, Sidney Martell MD, 10 mL at 01/09/21 0600    sodium chloride (NS) flush 5-40 mL, 5-40 mL, IntraVENous, PRN, Radha Rubio MD    ondansetron [facility]) injection 4 mg, 4 mg, IntraVENous, Q6H PRN, Radha Rubio MD    acetaminophen (TYLENOL) tablet 650 mg, 650 mg, Oral, Q4H PRN, Radha Rubio MD, 650 mg at 01/09/21 0200    enoxaparin (LOVENOX) injection 40 mg, 40 mg, SubCUTAneous, Q24H, Sidhom, Mima Waller MD, 40 mg at 01/08/21 2214    0.9% sodium chloride infusion, 50 mL/hr, IntraVENous, CONTINUOUS, Radha Rubio MD, Last Rate: 50 mL/hr at 01/08/21 1855, 50 mL/hr at 01/08/21 1855    tuberculin injection 5 Units, 5 Units, IntraDERMal, ONCE, Radha Rubio MD, 5 Units at 01/08/21 1854    metoprolol tartrate (LOPRESSOR) tablet 25 mg, 25 mg, Oral, BID, Radha Rubio MD, 25 mg at 01/09/21 2074    Recent Results (from the past 12 hour(s))   LIPID PANEL    Collection Time: 01/09/21  5:30 AM   Result Value Ref Range    LIPID PROFILE          Cholesterol, total 119 <200 MG/DL    Triglyceride 35 35 - 150 MG/DL    HDL Cholesterol 57 40 - 60 MG/DL    LDL, calculated 55 <100 MG/DL    VLDL, calculated 7 6.0 - 23.0 MG/DL    CHOL/HDL Ratio 2.1       CT Results (most recent):  Results from Hospital Encounter encounter on 01/08/21   CT CODE NEURO HEAD WO CONTRAST    Narrative CT head without contrast    History: patient with acute neuro changes. Left-sided weakness, aphasia    Technique: 5mm axial images were obtained from the skull base to the vertex  without contrast. Radiation dose reduction techniques were used for this study:   Our CT scanners use one or all of the following: Automated exposure control,  adjustment of the mA and/or kVp according to patient's size, iterative  reconstruction. Comparison: None. Findings: The ventricles and sulci are prominent but felt to be appropriate for  age. There are no extra-axial fluid collections. No evidence of acute  intraparenchymal hemorrhage or mass effect is identified.  Patchy areas of  decreased attenuation are noted within the supratentorial white matter. These  are nonspecific findings but would be most compatible with moderate chronic  small vessel ischemic changes. There is no evidence to suggest an acute major  territorial infarct. The bony calvarium is intact. The visualized mastoid air cells and paranasal  sinuses are well pneumatized and aerated. Impression Impression:    1. Findings most compatible with moderate chronic small vessel ischemic changes. 2. Otherwise unremarkable unenhanced CT scan of the brain. Physical Exam:  General - Well developed, well nourished, in no apparent distress. Pleasant and conversent. HEENT - Normocephalic, atraumatic. Conjunctiva are clear. Neck - Supple without masses  Cardiovascular - irregular rate and rhythm. Normal S1, S2 without murmurs, rubs, or gallops. Lungs - Clear to auscultation. Abdomen - Soft, nontender with normal bowel sounds. Extremities - Peripheral pulses intact. No edema and no rashes. Neurological examination - Comprehension, attention, memory and reasoning are intact. Language and speech are normal.   On cranial nerve examination, (II, III, IV, VI) pupils are equal, round, and reactive to light. Visual acuity is adequate. Visual fields are full to finger confrontation. Extraocular motility is normal. (V, VII) Face is symmetric and sensation is intact to light touch. (VIII) Hearing is intact. (IX, X) Palate and uvula elevate symmetrically. Voice is normal. (XI) Shoulder shrug is strong and equal bilaterally. (XII)Tongue is midline. Motor examination - There is normal muscle tone and bulk. Power is full throughout. Muscle stretch reflexes are normoactive and there are no pathological reflexes present. Plantar response is flexor. Sensation is intact to light touch, pinprick, vibration and proprioception in all extremities.  Cerebellar examination is normal.     Signed By: Lurdes Bob, NP January 9, 2021

## 2021-01-09 NOTE — PROGRESS NOTES
ACUTE OT GOALS:  (Developed with and agreed upon by patient and/or caregiver.)  1. Shayna Burns will be modified independent with functional mobility for ADL in room within 4 - 7 visits. 2. Shayna Burns will be modified independent with total body bathing and dressing within 4 - 7 visits. 3. Shayna Burns will state and demonstrate at least 5 energy conservation techniques during ADL/therapeutic activities within 4 - 7 visits. 4. Shayna Burns will voice a plan for appropriate home modifications for home safety and fall prevention within 7 visits. Aparna Estrella will participate at least 30 minutes of ADL with 3 or less rest breaks within 7 visits. 6. Shayna Burns will complete a tub transfer with modified independence within 7 visits.     OCCUPATIONAL THERAPY ASSESSMENT: Initial Assessment OT Treatment Day # 1    Shayna Burns is a 80 y.o. male   PRIMARY DIAGNOSIS: TIA (transient ischemic attack)  TIA (transient ischemic attack) [G45.9]       Reason for Referral:    ICD-10: Treatment Diagnosis: Generalized Muscle Weakness (M62.81)  OBSERVATION: Payor: [employer] MEDICARE / Plan: [facility] [employer] MEDICARE CHOICE PPO/PFFS / Product Type: Managed Care Medicare /   ASSESSMENT:     REHAB RECOMMENDATIONS:   Recommendation to date pending progress:  [address] [place] [address]  Equipment:    None     PRIOR LEVEL OF FUNCTION:  (Prior to Hospitalization)  INITIAL/CURRENT LEVEL OF FUNCTION:  (Most Recently Demonstrated)   Bathing:   Modified Independent  Dressing:   Modified Independent  Feeding/Grooming:   Modified Independent  Toileting:   Modified Independent  Functional Mobility:   Modified Independent Bathing:   Supervision  Dressing:   Supervision  Feeding/Grooming:   Supervision  Toileting:   Supervision  Functional Mobility:   Contact Guard Assistance     ASSESSMENT:  Mr. Blessing Estrella presents with debility/generalized weakness s/p episode of expressive aphasia. His co-morbidities include atrial fibrillation. MRI from today showed a \"small acute right parietal lobe infarct. \"  He was able to walk from the stretcher to the bedside chair with contact guard assistance. B UE grossly equal in strength. L shoulder lower than R, but feel this is baseline posture for him. Omero Cameron presents with the following problems and would benefit from occupational therapy to maximize independence with self-care,instrumental activities of daily living, and functional transfers/mobility for activities of daily living/instrumental activities of daily living via the stated goals. SUBJECTIVE:   Mr. Saint Nipper states, \"I lost a bet. \"    SOCIAL HISTORY/LIVING ENVIRONMENT:   Home Environment: Private residence  # Steps to Enter: 0  One/Two Story Residence: One story  Living Alone: No  Support Systems: Family member(s)    OBJECTIVE:     PAIN: VITAL SIGNS: LINES/DRAINS:   Pre Treatment: Pain Screen  Pain Scale 1: Numeric (0 - 10)  Pain Intensity 1: 0  Post Treatment: no complaints of pain   IV  O2 Device: Room air     GROSS EVALUATION:   Within Functional Limits Abnormal/ Functional Abnormal/ Non-Functional (see comments) Not Tested Comments:   AROM [] [x] [] []    PROM [] [x] [] []  R hand    Strength [] [x] [] []    Balance [] [x] [] []    Posture [] [x] [] []    Sensation [] [x] [] []    Coordination [] [x] [] []    Tone [] [] [] []    Edema [] [] [] []    Activity Tolerance [] [x] [] []     [] [] [] []      COGNITION/  PERCEPTION: Intact Impaired   (see comments) Comments:   Orientation [x] [] To person   Vision [] []    Hearing [] []    Judgment/ Insight [] []    Attention [] []    Memory [] []    Command Following [x] []    Emotional Regulation [] []     [] []      ACTIVITIES OF DAILY LIVING: I Mod I S SBA CGA Min Mod Max Total NT Comments   BASIC ADLs:              Bathing/ Showering [] [] [] [x] [] [] [] [] [] []    Toileting [] [] [] [x] [] [] [] [] [] []    Dressing [] [] [] [x] [] [] [] [] [] []    Feeding [] [] [] [x] [] [] [] [] [] []    Grooming [] [] [] [x] [] [] [] [] [] []    Personal Device Care [] [] [] [x] [] [] [] [] [] []    Functional Mobility [] [] [] [x] [] [] [] [] [] []    I=Independent, Mod I=Modified Independent, S=Supervision, SBA=Standby Assistance, CGA=Contact Guard Assistance,   Min=Minimal Assistance, Mod=Moderate Assistance, Max=Maximal Assistance, Total=Total Assistance, NT=Not Tested    MOBILITY: I Mod I S SBA CGA Min Mod Max Total  NT x2 Comments:   Supine to sit [] [] [] [] [] [] [] [] [] [] []    Sit to supine [] [] [] [] [] [] [] [] [] [] []    Sit to stand [] [] [] [] [x] [] [] [] [] [] []    Bed to chair [] [] [] [] [] [] [] [] [] [] []    I=Independent, Mod I=Modified Independent, S=Supervision, SBA=Standby Assistance, CGA=Contact Guard Assistance,   Min=Minimal Assistance, Mod=Moderate Assistance, Max=Maximal Assistance, Total=Total Assistance, NT=Not Tested    [facility] [address] [id number] AM-PAC 6 Clicks   Daily Activity Inpatient Short Form        How much help from another person does the patient currently need. .. Total A Lot A Little None   1. Putting on and taking off regular lower body clothing? [] 1   [] 2   [x] 3   [] 4   2. Bathing (including washing, rinsing, drying)? [] 1   [] 2   [x] 3   [] 4   3. Toileting, which includes using toilet, bedpan or urinal?   [] 1   [] 2   [x] 3   [] 4   4. Putting on and taking off regular upper body clothing? [] 1   [] 2   [x] 3   [] 4   5. Taking care of personal grooming such as brushing teeth? [] 1   [] 2   [x] 3   [] 4   6. Eating meals? [] 1   [] 2   [x] 3   [] 4   © 2007, Trustees of [address] [place] [address] [address] [address], under license to [employer]. All rights reserved     Score:  Initial: 18 Most Recent: X (Date: -- )   Interpretation of Tool:  Represents activities that are increasingly more difficult (i.e. Bed mobility, Transfers, Gait).     PLAN: FREQUENCY/DURATION: OT Plan of Care: 3 times/week for duration of hospital stay or until stated goals are met, whichever comes first.    PROBLEM LIST:   (Skilled intervention is medically necessary to address:)  1. Decreased ADL/Functional Activities  2. Decreased Activity Tolerance  3. Decreased AROM/PROM  4. Decreased Balance  5. Decreased Coordination  6. Decreased Strength  7. Decreased Transfer Abilities   INTERVENTIONS PLANNED:   (Benefits and precautions of occupational therapy have been discussed with the patient.)  1. Self Care Training  2. Therapeutic Activity  3. Therapeutic Exercise/HEP  4. Neuromuscular Re-education  5.  Education     TREATMENT:     EVALUATION: Low Complexity : (Untimed Charge)    TREATMENT:   (     )    AFTER TREATMENT POSITION/PRECAUTIONS:  Chair and Needs within reach    INTERDISCIPLINARY COLLABORATION:    TOTAL TREATMENT DURATION:  OT Patient Time In/Time Out  Time In: 1441  Time Out: CLIVE Griffith, OTR/L

## 2021-01-09 NOTE — PROGRESS NOTES
Virtual Utilization Review RN  has determined this patient meets the Condition Code 44 criteria under the Medicare guidelines for change from Inpatient to observation status. This case sent to second level physician review for correct status determination and agreed upon by your attending MD. Fer Condon . Admission status has been changed in the computer system. A copy of the Utilization Review RN documentation and the  [facility] letter explaining the  outpatient/observation services was given to  Patient with verbal explanation and verbal understanding about information given. Signature not required due to COVID precautions. Copy placed in the patients chart for scanning by HIS and copy left at bedside for patient information.

## 2021-01-09 NOTE — PROGRESS NOTES
01/08/21 1946   NIH Stroke Scale   Interval Other (comment)  (dual w Jackson)   LOC 0   LOC Questions 0   LOC Commands 0   Best Gaze 0   Visual 0   Facial Palsy 0   Motor Right Arm 0   Motor Left Arm 0   Motor Right Leg 0   Motor Left Leg 0   Limb Ataxia 0   Sensory 0   Best Language 0   Dysarthria 0   Extinction and Inattention 0   Total 0   Dual with Delbra Tone

## 2021-01-09 NOTE — PROGRESS NOTES
[facility] Cardiology       Date of  Admission: 1/8/2021 10:43 AM     HPI:  Beni Nicholas is a 79 yo WM with h/o HTN, LAZ and atrial fibrillation previously not on [address] [place] [address] Road due to poor balance who presented to [facility] ED with neurological changes and concern for TIA or CVA. Pt reportedly had difficulty speaking and EMS was called. On EMS arrival, patient was reportedly symptom-free. He denied any headache or blurry vision, chest pain, palpitations or shortness of breath. He was seen in [facility] ED and Code S was called. Neurology saw Pt with negative CT, MRI pending. Pt has h/o AF and was in AF in ED and cardiology was consulted. Echo showed EF 55-60%, no regional wall motion abnormality, moderately to markedly dilated LA, atrial septum with no right-to-left shunt, at rest or induced by the Valsalva maneuver, moderately dilated RA, mild aortic stenosis, mild TR. Pt states he has poor balance because of his knees and uses a cane or walker for balance. He had a fall about 2 months ago in [place] and had a right hip fracture which required surgery. He also had hematuria during that admission.       Past Medical History:   Diagnosis Date    Abnormal chest x-ray 5/6/2013    Lung shadow    Asbestos exposure 5/6/2013    Asbestos exposure     hx of as     Benign prostatic hypertrophy 5/6/2013    Hyperlipidemia 5/6/2013    Sleep apnea     Unspecified essential hypertension 5/6/2013      Past Surgical History:   Procedure Laterality Date    HX ORTHOPAEDIC      wrist fx    HX ORTHOPAEDIC      kirstin in femur screw in hip    HX OTHER SURGICAL      Bilateral wrist surgery    HX TONSILLECTOMY         No Known Allergies   Social History     Socioeconomic History    Marital status:      Spouse name: Not on file    Number of children: Not on file    Years of education: Not on file    Highest education level: Not on file   Occupational History    Not on file   Social Needs    Financial resource strain: Not on file    Food insecurity     Worry: Not on file     Inability: Not on file    Transportation needs     Medical: Not on file     Non-medical: Not on file   Tobacco Use    Smoking status: Never Smoker    Smokeless tobacco: Never Used   Substance and Sexual Activity    Alcohol use: Yes     Comment: 1 drink daily    Drug use: Not on file    Sexual activity: Not on file   Lifestyle    Physical activity     Days per week: Not on file     Minutes per session: Not on file    Stress: Not on file   Relationships    Social connections     Talks on phone: Not on file     Gets together: Not on file     Attends Congregational service: Not on file     Active member of club or organization: Not on file     Attends meetings of clubs or organizations: Not on file     Relationship status: Not on file    Intimate partner violence     Fear of current or ex partner: Not on file     Emotionally abused: Not on file     Physically abused: Not on file     Forced sexual activity: Not on file   Other Topics Concern    Not on file   Social History Narrative    Not on file     Family History   Problem Relation Age of Onset    Heart Attack Son 40         secondary to MI    Hypertension Mother     Hypertension Father     Diabetes Father     Stroke Father     Cancer Brother         Current Facility-Administered Medications   Medication Dose Route Frequency    aspirin chewable tablet 81 mg  81 mg Oral DAILY    tamsulosin (FLOMAX) capsule 0.4 mg  0.4 mg Oral DAILY    finasteride (PROSCAR) tablet 5 mg  5 mg Oral DAILY    atorvastatin (LIPITOR) tablet 80 mg  80 mg Oral DAILY    sodium chloride (NS) flush 5-40 mL  5-40 mL IntraVENous Q8H    sodium chloride (NS) flush 5-40 mL  5-40 mL IntraVENous PRN    ondansetron (ZOFRAN) injection 4 mg  4 mg IntraVENous Q6H PRN    acetaminophen (TYLENOL) tablet 650 mg  650 mg Oral Q4H PRN    enoxaparin (LOVENOX) injection 40 mg  40 mg SubCUTAneous Q24H    0.9% sodium chloride infusion  50 mL/hr IntraVENous CONTINUOUS    tuberculin injection 5 Units  5 Units IntraDERMal ONCE    metoprolol tartrate (LOPRESSOR) tablet 25 mg  25 mg Oral BID       Review of symptoms:  General: no recent weight loss/gain, weakness, fatigue, fever or chills   Skin: no rashes, lumps, or other skin changes   HEENT: no headache, dizziness, lightheadedness, vision changes, hearing changes, tinnitus, vertigo, sinus pressure/pain, bleeding gums, sore throat, or hoarseness   Neck: no swollen glands, goiter, pain or stiffness   Respiratory: no cough, sputum, hemoptysis, dyspnea, wheezing   Cardiovascular: no chest pain or discomfort, palpitations, dyspnea, orthopnea, paroxysmal nocturnal dyspnea, peripheral edema   Gastrointestinal: no trouble swallowing, heartburn, change of appetite, nausea, change in bowel habits, pain with defecation, rectal bleeding or black/tarry stools, hemorrhoids, constipation, diarrhea, abdominal pain, jaundice, liver or gallbladder problems   Urinary: no frequency, urgency , hematuria, burning/pain with urination, recent flank pain, polyuria, nocturia, or difficulty urinating   Peripheral Vascular: no claudication, leg cramps, prior DVTs, swelling of calves, legs, or feet, color change, or swelling with redness or tenderness   Musculoskeletal: no muscle or joint pain/stiffness, joint swelling, erythema of joints, or back pain   Psychiatric: no depression, mental disorders, or excessive stress   Neurological: no history of CVA, dizziness, no sensory or motor loss, seizures, syncope, tremors, numbness, tingling, no changes in mood, attention, or speech, no changes in orientation, memory, insight, or judgment. no headache, vertigo.    Hematologic: no anemia, easy bruising or bleeding   Endocrine: no diabetes, thyroid problems, heat or cold intolerance, excessive sweating, polyuria, polydipsia        Subjective:   Physical Exam    Visit Vitals  BP (!) 165/86 (BP 1 Location: Left arm, BP Patient Position: At rest)   Pulse 74   Temp 98.4 °F (36.9 °C)   Resp 18   Ht 5' 7\" (1.702 m)   Wt 93.4 kg (206 lb)   SpO2 98%   BMI 32.26 kg/m²     General Appearance:  Well developed, well nourished,alert and oriented x 3, and individual in no acute distress. Ears/Nose/Mouth/Throat:   Hearing grossly normal.         Neck: Supple. Chest:   Lungs clear to auscultation bilaterally. Cardiovascular:  Regular rate and rhythm, S1, S2 normal, no murmur. Abdomen:   Soft, non-tender, bowel sounds are active. Extremities: No edema bilaterally. Skin: Warm and dry. Labs:   Recent Results (from the past 24 hour(s))   LIPID PANEL    Collection Time: 01/09/21  5:30 AM   Result Value Ref Range    LIPID PROFILE          Cholesterol, total 119 <200 MG/DL    Triglyceride 35 35 - 150 MG/DL    HDL Cholesterol 57 40 - 60 MG/DL    LDL, calculated 55 <100 MG/DL    VLDL, calculated 7 6.0 - 23.0 MG/DL    CHOL/HDL Ratio 2.1        Assessment/Plan:       Diagnosis    Possible TIA (transient ischemic attack) per primary team and neurology, CT negative, MRI pending    Paroxysmal atrial fibrillation- previously not on [facility] due to poor balance, rate control with BB. MRI pending, if TIA suspected and neurology clears Pt to start [facility], recommend starting [facility].  Will discuss with Dr. Ajit Urbina LAZ (obstructive sleep apnea)- Difficulty with CPAP full face mask use    [place] (hard of hearing)    Carotid artery disease ([address]    Essential hypertension- continue home meds, titrate as needed    Thoracic aortic aneurysm without rupture ([address]    Hyperlipidemia    Benign prostatic hyperplasia without lower urinary tract symptoms    Asbestos exposure         Abbey Ga PA-C

## 2021-01-09 NOTE — PROGRESS NOTES
SPEECH LANGUAGE PATHOLOGY: DYSPHAGIA- Initial Assessment and Discharge    NAME/AGE/GENDER: Omero Cameron is a 80 y.o. male  DATE: 1/9/2021  PRIMARY DIAGNOSIS: TIA (transient ischemic attack) [G45.9]      ICD-10: Treatment Diagnosis: R13.12 Dysphagia, Oropharyngeal Phase    RECOMMENDATIONS   DIET:    continue prescribed diet    MEDICATIONS: With liquid     ASPIRATION PRECAUTIONS  · Slow rate of intake  · Small bites/sips  · Upright at 90 degrees during meal     COMPENSATORY STRATEGIES/MODIFICATIONS  · None     RECOMMENDATIONS for CONTINUED SPEECH THERAPY:   No further speech therapy indicated at this time. ASSESSMENT   Patient presents with no overt s/sx with any/all consistencies. Tolerated 75% of am meal. Speech has returned to baseline per his report (baseline word finding deficits \"for years\"). No further episodes of \"word salad\" since yesterday with son-in-law. Recommend no further skilled speech therapy intervention at this time. Continue regular diet textures/thin liquids. CONTINUATION OF SKILLED SERVICES/MEDICAL NECESSITY:   No additional speech services warranted. EDUCATION:  · Recommendations discussed with Patient    PLAN    FREQUENCY/DURATION: No further speech therapy indicated at this time as oropharyngeal swallow function is within normal limits. - Recommendations for next treatment session: No additional speech therapy indicated at this time. SUBJECTIVE   Pleasant, talkative. Provides good history. Oxygen Device: none  Pain: Pain Scale 1: Numeric (0 - 10)  Pain Intensity 1: 0    History of Present Injury/Illness: Mr. Saint Nipper  has a past medical history of Abnormal chest x-ray (5/6/2013), Asbestos exposure (5/6/2013), Asbestos exposure, Benign prostatic hypertrophy (5/6/2013), Hyperlipidemia (5/6/2013), Sleep apnea, and Unspecified essential hypertension (5/6/2013). Layla Loyola  He also  has a past surgical history that includes hx tonsillectomy; hx other surgical; hx orthopaedic; and hx orthopaedic. PRECAUTIONS/ALLERGIES: Patient has no known allergies. Problem List:  (Impairments causing functional limitations):  1. ? TIA    Previous Dysphagia: NONE REPORTED  Diet Prior to Evaluation: regular/thin    Orientation:  Person  Place  Time  Situation    Cognitive-Linguistic Screening:   Speech Production:   o WFL   Expressive Language:  o Mild word finding which patient denotes at baseline \"for years\"   Receptive Language:  o Appears WFL   Cognition:   o Patient reports baseline  Prior Level of Function: lives with daughter, independent with ADLs  Recommendations: Given results of screening, patient appears to be functioning at baseline. No acute assessment of speech, language, or cognition warranted. OBJECTIVE   Oral Motor:   · Labial: No impairment  · Oral Hygiene: Adequate  · Lingual: No impairment    Swallow evaluation:   Patient consumed trials of regular diet textures, thin liquids. Adequate mastication and oral clearance observed. No overt s/sx with any/all presentations. Continue regular diet textures/thin liquids. Speech has returned to baseline.      Tool Used: Dysphagia Outcome and Severity Scale (FORTUNATO)    Score Comments   Normal Diet  [x] 7 With no strategies or extra time needed   Functional Swallow  [] 6 May have mild oral or pharyngeal delay   Mild Dysphagia  [] 5 Which may require one diet consistency restricted    Mild-Moderate Dysphagia  [] 4 With 1-2 diet consistencies restricted   Moderate Dysphagia  [] 3 With 2 or more diet consistencies restricted   Moderate-Severe Dysphagia  [] 2 With partial PO strategies (trials with ST only)   Severe Dysphagia  [] 1 With inability to tolerate any PO safely      Score:  Initial: 7 Most Recent: n/a (Date 01/09/21 )   Interpretation of Tool: The Dysphagia Outcome and Severity Scale (FORTUNATO) is a simple, easy-to-use, 7-point scale developed to systematically rate the functional severity of dysphagia based on objective assessment and make recommendations for diet level, independence level, and type of nutrition. Current Medications:   No current facility-administered medications on file prior to encounter. Current Outpatient Medications on File Prior to Encounter   Medication Sig Dispense Refill    OTHER Walker with wheels and a seat 1 Tab 0    vit C/E/Zn/coppr/lutein/zeaxan (PRESERVISION AREDS-2 PO) Take  by mouth.  metoprolol succinate (TOPROL-XL) 25 mg XL tablet Take 1 Tab by mouth two (2) times a day. 180 Tab 3    finasteride (PROSCAR) 5 mg tablet Take 1 Tab by mouth daily. 90 Tab 3    multivitamin (ONE A DAY) tablet Take 1 Tab by mouth daily.  tamsulosin (FLOMAX) 0.4 mg capsule TAKE 1 CAPSULE BY MOUTH EVERY DAY FOR 14 DAYS      vitamin B complex (Super B-50 Complex) capsule       co-enzyme Q-10 (CO Q-10) 100 mg capsule       simvastatin (ZOCOR) 20 mg tablet Take 1 Tab by mouth nightly. 90 Tab 3    aspirin delayed-release 81 mg tablet Take  by mouth daily.  turmeric root extract 500 mg cap Take  by mouth.  cholecalciferol (VITAMIN D3) 1,000 unit cap Take  by mouth daily.  loratadine 10 mg cap Take  by mouth.  Omega-3-DHA-EPA-Fish Oil 1,200 (144-216) mg cap Take 1 Cap by mouth three (3) times daily. INTERDISCIPLINARY COLLABORATION: n/a    After treatment position/precautions:  · Upright in bed    Total Treatment Duration:   Time In: 0735  Time Out: 0800    Antoni Valentine.  Rere, MS, CCC-SLP

## 2021-01-09 NOTE — PROGRESS NOTES
Physical therapy evaluation orders received and chart reviewed. Attempted to see patient this AM to initiate assessment. Patient off floor in CT. Will follow and re-attempt at a later time/date as schedule permits/patient available.      Thank you,  Temi Sue, PT, DPT

## 2021-01-09 NOTE — PROGRESS NOTES
01/09/21 0736   NIH Stroke Scale   Interval Other (comment)  (Dual shift change [facility]. )   LOC 0   LOC Questions 0   LOC Commands 0   Best Gaze 0   Visual 0   Facial Palsy 0   Motor Right Arm 0   Motor Left Arm 0   Motor Right Leg 0   Motor Left Leg 0   Limb Ataxia 0   Sensory 0   Best Language 0   Dysarthria 0   Extinction and Inattention 0   Total 0     Dual shift change [facility] with Nuria Pathak RN.

## 2021-01-09 NOTE — PROGRESS NOTES
Problem: Patient Education: Go to Patient Education Activity  Goal: Patient/Family Education  Outcome: Progressing Towards Goal     Problem: TIA/CVA Stroke: 0-24 hours  Goal: Off Pathway (Use only if patient is Off Pathway)  Outcome: Progressing Towards Goal  Goal: Activity/Safety  Outcome: Progressing Towards Goal  Goal: Consults, if ordered  Outcome: Progressing Towards Goal  Goal: Diagnostic Test/Procedures  Outcome: Progressing Towards Goal  Goal: Nutrition/Diet  Outcome: Progressing Towards Goal  Goal: Discharge Planning  Outcome: Progressing Towards Goal  Goal: Medications  Outcome: Progressing Towards Goal  Goal: Respiratory  Outcome: Progressing Towards Goal  Goal: Treatments/Interventions/Procedures  Outcome: Progressing Towards Goal  Goal: Minimize risk of bleeding post-thrombolytic infusion  Outcome: Progressing Towards Goal  Goal: Monitor for complications post-thrombolytic infusion  Outcome: Progressing Towards Goal  Goal: Psychosocial  Outcome: Progressing Towards Goal  Goal: *Hemodynamically stable  Outcome: Progressing Towards Goal  Goal: *Neurologically stable  Description: Absence of additional neurological deficits    Outcome: Progressing Towards Goal  Goal: *Verbalizes anxiety and depression are reduced or absent  Outcome: Progressing Towards Goal  Goal: *Absence of Signs of Aspiration on Current Diet  Outcome: Progressing Towards Goal  Goal: *Absence of deep venous thrombosis signs and symptoms(Stroke Metric)  Outcome: Progressing Towards Goal  Goal: *Ability to perform ADLs and demonstrates progressive mobility and function  Outcome: Progressing Towards Goal  Goal: *Stroke education started(Stroke Metric)  Outcome: Progressing Towards Goal  Goal: *Dysphagia screen performed(Stroke Metric)  Outcome: Progressing Towards Goal  Goal: *Rehab consulted(Stroke Metric)  Outcome: Progressing Towards Goal     Problem: TIA/CVA Stroke: Day 2 Until Discharge  Goal: Off Pathway (Use only if patient is Off Pathway)  Outcome: Progressing Towards Goal  Goal: Activity/Safety  Outcome: Progressing Towards Goal  Goal: Diagnostic Test/Procedures  Outcome: Progressing Towards Goal  Goal: Nutrition/Diet  Outcome: Progressing Towards Goal  Goal: Discharge Planning  Outcome: Progressing Towards Goal  Goal: Medications  Outcome: Progressing Towards Goal  Goal: Respiratory  Outcome: Progressing Towards Goal  Goal: Treatments/Interventions/Procedures  Outcome: Progressing Towards Goal  Goal: Psychosocial  Outcome: Progressing Towards Goal  Goal: *Verbalizes anxiety and depression are reduced or absent  Outcome: Progressing Towards Goal  Goal: *Absence of aspiration  Outcome: Progressing Towards Goal  Goal: *Absence of deep venous thrombosis signs and symptoms(Stroke Metric)  Outcome: Progressing Towards Goal  Goal: *Optimal pain control at patient's stated goal  Outcome: Progressing Towards Goal  Goal: *Tolerating diet  Outcome: Progressing Towards Goal  Goal: *Ability to perform ADLs and demonstrates progressive mobility and function  Outcome: Progressing Towards Goal  Goal: *Stroke education continued(Stroke Metric)  Outcome: Progressing Towards Goal     Problem: Ischemic Stroke: Discharge Outcomes  Goal: *Verbalizes anxiety and depression are reduced or absent  Outcome: Progressing Towards Goal  Goal: *Verbalize understanding of risk factor modification(Stroke Metric)  Outcome: Progressing Towards Goal  Goal: *Hemodynamically stable  Outcome: Progressing Towards Goal  Goal: *Absence of aspiration pneumonia  Outcome: Progressing Towards Goal  Goal: *Aware of needed dietary changes  Outcome: Progressing Towards Goal  Goal: *Verbalize understanding of prescribed medications including anti-coagulants, anti-lipid, and/or anti-platelets(Stroke Metric)  Outcome: Progressing Towards Goal  Goal: *Tolerating diet  Outcome: Progressing Towards Goal  Goal: *Aware of follow-up diagnostics related to anticoagulants  Outcome: Progressing Towards Goal  Goal: *Ability to perform ADLs and demonstrates progressive mobility and function  Outcome: Progressing Towards Goal  Goal: *Absence of DVT(Stroke Metric)  Outcome: Progressing Towards Goal  Goal: *Absence of aspiration  Outcome: Progressing Towards Goal  Goal: *Optimal pain control at patient's stated goal  Outcome: Progressing Towards Goal  Goal: *Home safety concerns addressed  Outcome: Progressing Towards Goal  Goal: *Describes available resources and support systems  Outcome: Progressing Towards Goal  Goal: *Verbalizes understanding of activation of EMS(911) for stroke symptoms(Stroke Metric)  Outcome: Progressing Towards Goal  Goal: *Understands and describes signs and symptoms to report to providers(Stroke Metric)  Outcome: Progressing Towards Goal  Goal: *Neurolgocially stable (absence of additional neurological deficits)  Outcome: Progressing Towards Goal  Goal: *Verbalizes importance of follow-up with primary care physician(Stroke Metric)  Outcome: Progressing Towards Goal  Goal: *Smoking cessation discussed,if applicable(Stroke Metric)  Outcome: Progressing Towards Goal  Goal: *Depression screening completed(Stroke Metric)  Outcome: Progressing Towards Goal     Problem: Falls - Risk of  Goal: *Absence of Falls  Description: Document Thad Fall Risk and appropriate interventions in the flowsheet.   Outcome: Progressing Towards Goal  Note: Fall Risk Interventions:  Mobility Interventions: Communicate number of staff needed for ambulation/transfer    Mentation Interventions: Bed/chair exit alarm    Medication Interventions: Bed/chair exit alarm    Elimination Interventions: Call light in reach              Problem: Patient Education: Go to Patient Education Activity  Goal: Patient/Family Education  Outcome: Progressing Towards Goal     Problem: Patient Education: Go to Patient Education Activity  Goal: Patient/Family Education  Outcome: Progressing Towards Goal

## 2021-01-09 NOTE — PROGRESS NOTES
Hospitalist Progress Note     Admit Date:  2021 10:43 AM   Name:  Hesham Olivares   Age:  80 y.o.  :  1931   MRN:  [id number]   PCP:  Deedee Gonsales MD  Treatment Team: Attending Provider: Filomena Briseno MD; Primary Nurse: Carlin Diallo, RN; Consulting Provider: Stacia Og DO; Physical Therapist: Kg Alva, PT, DPT; Occupational Therapist: Jane Smith, OTD, OTR/L; Utilization Review: Mariam Smith    Subjective:   HPI and or CC:  79 yo male w/ hx of HTN, LAZ and atrial fibrillation not on any blood thinner.  Woke at 6:30 AM and had breakfast in his father-in-law's week.  When his son-in-law came by around 8 he tried to speak with him and could not get his words out.  This lasted for maybe a couple minutes.  EMS was called out and patient brought here for possible TIA.  On EMS arrival patient was symptom-free.  Has remained symptom-free to the ER.  Denies any headache or blurry vision. No Chest pain or shortness of breath.  Is hypertensive with rapid A. fib in the ER room. On further questioning, his symptom lasted about 1 hour and resolved. No other symptoms reported. He was seen in ED by Neurology. :  Patient alert and oriented x3. MRI brain and CTA head/neck pending. Afebrile. **anticipate discharge next 1-2 days. Plan for home with family. **Daughter-Leonides Oliver-updated via phone at 1026. All questions answered. **received message from nursing daughter wanting update about tests at 162. I called in room and spoke with daughter, again, and MRI results relayed to her.     Objective:     Patient Vitals for the past 24 hrs:   Temp Pulse Resp BP SpO2   21 0400 97.9 °F (36.6 °C) 65 16 117/68 97 %   21 0000 97.4 °F (36.3 °C) 75 16 107/66 95 %   21 2000 97.4 °F (36.3 °C) 91 16 (!) 178/103 95 %   21 1714 -- 76 11 (!) 156/93 96 %   01/08/21 1650 -- 75 15 (!) 160/90 96 %   21 1644 -- 78 11 (!) 165/94 97 %   21 1631 -- 71 18 -- 97 %   01/08/21 1628 -- 75 24 (!) 183/98 92 %   01/08/21 1545 -- 86 -- -- 97 %   01/08/21 1459 -- 76 13 (!) 149/92 97 %   01/08/21 1444 -- 76 17 (!) 171/89 96 %   01/08/21 1314 -- 79 15 128/70 98 %   01/08/21 1259 -- 75 13 (!) 142/81 95 %   01/08/21 1243 -- 83 13 133/81 96 %   01/08/21 1214 -- 75 13 (!) 149/89 97 %   01/08/21 1112 -- -- -- (!) 200/155 --   01/08/21 1054 -- (!) 130 25 (!) 213/114 97 %   01/08/21 1046 98.7 °F (37.1 °C) (!) 108 18 (!) 194/118 97 %     Oxygen Therapy  O2 Sat (%): 97 % (01/09/21 0400)  Pulse via Oximetry: 69 beats per minute (01/08/21 1714)  O2 Device: Room air (01/08/21 1118)  No intake or output data in the 24 hours ending 01/09/21 1027      REVIEW OF SYSTEMS: Comprehensive ROS performed and negative except as stated in HPI. Physical Examination:  General:    Well nourished. No gross distress. Head:  Normocephalic, atraumatic, nares patent  Eyes:  Extraocular movements intact, normal sclera  CV:   RRR. No  Murmurs, clicks, or gallops, distal pulses intact; irregular irreg  Lungs:   Unlabored, no cyanosis, no wheeze  Abdomen:   Soft, nondistended, nontender. Extremities: Warm and dry. No cyanosis or edema. Skin:     No rashes or jaundice. Neuro:  No gross focal deficits, no tremor  Psych:  Mood and affect appropriate    Data Review:  I have reviewed all labs, meds, telemetry events, and studies from the last 24 hours.     Recent Results (from the past 24 hour(s))   CBC WITH AUTOMATED DIFF    Collection Time: 01/08/21 10:52 AM   Result Value Ref Range    WBC 6.2 4.3 - 11.1 K/uL    RBC 4.37 4.23 - 5.6 M/uL    HGB 14.1 13.6 - 17.2 g/dL    HCT 42.1 41.1 - 50.3 %    MCV 96.3 79.6 - 97.8 FL    MCH 32.3 26.1 - 32.9 PG    MCHC 33.5 31.4 - 35.0 g/dL    RDW 14.3 11.9 - 14.6 %    PLATELET 686 (L) 641 - 450 K/uL    MPV 11.0 9.4 - 12.3 FL    ABSOLUTE NRBC 0.00 0.0 - 0.2 K/uL    DF AUTOMATED      NEUTROPHILS 59 43 - 78 %    LYMPHOCYTES 29 13 - 44 %    MONOCYTES 8 4.0 - 12.0 %    EOSINOPHILS 2 0.5 - 7.8 %    BASOPHILS 1 0.0 - 2.0 %    IMMATURE GRANULOCYTES 0 0.0 - 5.0 %    ABS. NEUTROPHILS 3.7 1.7 - 8.2 K/UL    ABS. LYMPHOCYTES 1.8 0.5 - 4.6 K/UL    ABS. MONOCYTES 0.5 0.1 - 1.3 K/UL    ABS. EOSINOPHILS 0.1 0.0 - 0.8 K/UL    ABS. BASOPHILS 0.1 0.0 - 0.2 K/UL    ABS. IMM.  GRANS. 0.0 0.0 - 0.5 K/UL   METABOLIC PANEL, BASIC    Collection Time: 01/08/21 10:52 AM   Result Value Ref Range    Sodium 139 138 - 145 mmol/L    Potassium 3.6 3.5 - 5.1 mmol/L    Chloride 105 98 - 107 mmol/L    CO2 29 21 - 32 mmol/L    Anion gap 5 (L) 7 - 16 mmol/L    Glucose 117 (H) 65 - 100 mg/dL    BUN 17 8 - 23 MG/DL    Creatinine 1.16 0.8 - 1.5 MG/DL    GFR est AA >60 >60 ml/min/1.73m2    GFR est non-AA >60 >60 ml/min/1.73m2    Calcium 9.1 8.3 - 10.4 MG/DL   PROTHROMBIN TIME + INR    Collection Time: 01/08/21 10:52 AM   Result Value Ref Range    Prothrombin time 16.3 (H) 12.5 - 14.7 sec    INR 1.3     TROPONIN-HIGH SENSITIVITY    Collection Time: 01/08/21 10:52 AM   Result Value Ref Range    Troponin-High Sensitivity 14.0 0 - 14 pg/mL   HEMOGLOBIN A1C WITH EAG    Collection Time: 01/08/21 10:52 AM   Result Value Ref Range    Hemoglobin A1c 5.5 4.20 - 6.30 %    Est. average glucose 111 mg/dL   LIPID PANEL    Collection Time: 01/08/21 10:52 AM   Result Value Ref Range    LIPID PROFILE          Cholesterol, total 132 <200 MG/DL    Triglyceride 48 35 - 150 MG/DL    HDL Cholesterol 65 (H) 40 - 60 MG/DL    LDL, calculated 57.4 <100 MG/DL    VLDL, calculated 9.6 6.0 - 23.0 MG/DL    CHOL/HDL Ratio 2.0     TSH 3RD GENERATION    Collection Time: 01/08/21 10:52 AM   Result Value Ref Range    TSH 1.910 0.358 - 3.740 uIU/mL   GLUCOSE, POC    Collection Time: 01/08/21 10:53 AM   Result Value Ref Range    Glucose (POC) 119 (H) 65 - 100 mg/dL   POC PT/INR    Collection Time: 01/08/21 10:54 AM   Result Value Ref Range    Prothrombin time (POC) 16.0 (H) 9.6 - 11.6 SECS    INR (POC) 1.4 (H) 0.9 - 1.2     EKG, 12 LEAD, INITIAL Collection Time: 01/08/21 10:55 AM   Result Value Ref Range    Ventricular Rate 133 BPM    Atrial Rate 111 BPM    QRS Duration 80 ms    Q-T Interval 274 ms    QTC Calculation (Bezet) 407 ms    Calculated P Axis 0 degrees    Calculated T Axis -24 degrees    Diagnosis       !! AGE AND GENDER SPECIFIC ECG ANALYSIS !!  af with rvr with occasional Premature ventricular complexes  Confirmed by Agustin Duane MD (), REGGIE TANG ([id number]) on 1/8/2021 1:55:45 PM     LIPID PANEL    Collection Time: 01/09/21  5:30 AM   Result Value Ref Range    LIPID PROFILE          Cholesterol, total 119 <200 MG/DL    Triglyceride 35 35 - 150 MG/DL    HDL Cholesterol 57 40 - 60 MG/DL    LDL, calculated 55 <100 MG/DL    VLDL, calculated 7 6.0 - 23.0 MG/DL    CHOL/HDL Ratio 2.1          All Micro Results     None          Current Meds:  Current Facility-Administered Medications   Medication Dose Route Frequency    aspirin chewable tablet 81 mg  81 mg Oral DAILY    tamsulosin (FLOMAX) capsule 0.4 mg  0.4 mg Oral DAILY    finasteride (PROSCAR) tablet 5 mg  5 mg Oral DAILY    atorvastatin (LIPITOR) tablet 80 mg  80 mg Oral DAILY    sodium chloride (NS) flush 5-40 mL  5-40 mL IntraVENous Q8H    sodium chloride (NS) flush 5-40 mL  5-40 mL IntraVENous PRN    ondansetron (ZOFRAN) injection 4 mg  4 mg IntraVENous Q6H PRN    acetaminophen (TYLENOL) tablet 650 mg  650 mg Oral Q4H PRN    enoxaparin (LOVENOX) injection 40 mg  40 mg SubCUTAneous Q24H    0.9% sodium chloride infusion  50 mL/hr IntraVENous CONTINUOUS    tuberculin injection 5 Units  5 Units IntraDERMal ONCE    metoprolol tartrate (LOPRESSOR) tablet 25 mg  25 mg Oral BID       Diet:  DIET CARDIAC    Other Studies (last 24 hours):  Ct Code Neuro Head Wo Contrast    Result Date: 1/8/2021  CT head without contrast History: patient with acute neuro changes.   Left-sided weakness, aphasia Technique: 5mm axial images were obtained from the skull base to the vertex without contrast. Radiation dose reduction techniques were used for this study: Our CT scanners use one or all of the following: Automated exposure control, adjustment of the mA and/or kVp according to patient's size, iterative reconstruction. Comparison: None. Findings: The ventricles and sulci are prominent but felt to be appropriate for age. There are no extra-axial fluid collections. No evidence of acute intraparenchymal hemorrhage or mass effect is identified. Patchy areas of decreased attenuation are noted within the supratentorial white matter. These are nonspecific findings but would be most compatible with moderate chronic small vessel ischemic changes. There is no evidence to suggest an acute major territorial infarct. The bony calvarium is intact. The visualized mastoid air cells and paranasal sinuses are well pneumatized and aerated. Impression: 1. Findings most compatible with moderate chronic small vessel ischemic changes. 2. Otherwise unremarkable unenhanced CT scan of the brain. Assessment and Plan:     Hospital Problems as of 1/9/2021 Date Reviewed: 12/30/2020          Codes Class Noted - Resolved POA    * (Principal) TIA (transient ischemic attack) ICD-10-CM: G45.9  ICD-9-CM: 435.9  1/8/2021 - Present Yes              A/P:    1. 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 Essential HTN:  Home medications    3. A Fib:  Home medications  Telemetry  Cardiology consult    4. LAZ:  Noted  Family to bring machine today    5.  Obesity:  Noted  Cardiac diet          Signed:  Daria Esquivel NP

## 2021-01-09 NOTE — PROGRESS NOTES
01/08/21 1815   Dual Skin Pressure Injury Assessment   Dual Skin Pressure Injury Assessment WDL   Second Care Provider (Based on [address] [place] [address]) Tali Shaw   Skin Integumentary   Skin Integumentary (WDL) WDL    Pressure  Injury Documentation No Pressure Injury Noted-Pressure Ulcer Prevention Initiated   Dual with Tali Shaw

## 2021-01-10 VITALS
HEART RATE: 79 BPM | TEMPERATURE: 97.7 F | HEIGHT: 67 IN | BODY MASS INDEX: 32.33 KG/M2 | DIASTOLIC BLOOD PRESSURE: 97 MMHG | OXYGEN SATURATION: 99 % | WEIGHT: 206 LBS | SYSTOLIC BLOOD PRESSURE: 158 MMHG | RESPIRATION RATE: 18 BRPM

## 2021-01-10 PROCEDURE — 99232 SBSQ HOSP IP/OBS MODERATE 35: CPT | Performed by: PSYCHIATRY & NEUROLOGY

## 2021-01-10 PROCEDURE — 74011250637 HC RX REV CODE- 250/637: Performed by: HOSPITALIST

## 2021-01-10 PROCEDURE — 99218 HC RM OBSERVATION: CPT

## 2021-01-10 PROCEDURE — 74011250637 HC RX REV CODE- 250/637: Performed by: INTERNAL MEDICINE

## 2021-01-10 RX ORDER — METOPROLOL TARTRATE 25 MG/1
25 TABLET, FILM COATED ORAL 2 TIMES DAILY
Qty: 60 TAB | Refills: 0 | Status: SHIPPED | OUTPATIENT
Start: 2021-01-10 | End: 2021-01-19 | Stop reason: SDUPTHER

## 2021-01-10 RX ORDER — ATORVASTATIN CALCIUM 80 MG/1
80 TABLET, FILM COATED ORAL DAILY
Qty: 30 TAB | Refills: 0 | Status: SHIPPED | OUTPATIENT
Start: 2021-01-11 | End: 2021-02-02 | Stop reason: SDUPTHER

## 2021-01-10 RX ADMIN — METOPROLOL TARTRATE 25 MG: 25 TABLET, FILM COATED ORAL at 08:30

## 2021-01-10 RX ADMIN — APIXABAN 5 MG: 5 TABLET, FILM COATED ORAL at 08:30

## 2021-01-10 RX ADMIN — FINASTERIDE 5 MG: 5 TABLET, FILM COATED ORAL at 08:30

## 2021-01-10 RX ADMIN — ATORVASTATIN CALCIUM 80 MG: 80 TABLET, FILM COATED ORAL at 08:30

## 2021-01-10 RX ADMIN — Medication 5 ML: at 05:23

## 2021-01-10 RX ADMIN — TAMSULOSIN HYDROCHLORIDE 0.4 MG: 0.4 CAPSULE ORAL at 08:30

## 2021-01-10 NOTE — PROGRESS NOTES
01/09/21 1905   NIH Stroke Scale   Interval Other (comment)  (Dual with Blaire BUTLER)   LOC 0   LOC Questions 1   LOC Commands 0   Best Gaze 0   Visual 0   Facial Palsy 0   Motor Right Arm 0   Motor Left Arm 0   Motor Right Leg 0   Motor Left Leg 0   Limb Ataxia 0   Sensory 0   Best Language 0   Dysarthria 0   Extinction and Inattention 0   Total 1

## 2021-01-10 NOTE — PROGRESS NOTES
Pt is an 81 yo male admitted due to a TIA/CVA. SW met with pt to discuss dc needs. Per hospital protocol, CM wore appropriate PPE and observed social distancing. No direct physical contact. Pt confirmed that he lives with his dtr and SHERRI. He was independent with his ADL's PTA and uses a rollator for ambylation assistance. Therapy evals complete with the recommendation for either home health or outpatient therapy services. No ST needs. Pt requested [facility] servoces with a transition to outaptient servoces if needed. Per pt's request, SW spoke with pt's dtr, via phone, re: choice of [facility] agency. She expressed no preference of [facility] agency and was agreeable to a referral to [facility]. Order received for RN/PT/OT servoivces and referral submitted. Demographics, insurance and PCP confirmed. Dtr requested that [facility] call her to schedule the initial home visit. Pt is medically cleared for dc to home today. SW remains available to assist as needed. Care Management Interventions  PCP Verified by CM: Yes  Last Visit to PCP: 10/26/20  Mode of Transport at Discharge:  Other (see comment)(family)  Transition of Care Consult (CM Consult): Discharge Planning, [address]: Yes  Discharge Durable Medical Equipment: No(Has rollator in the home)  Physical Therapy Consult: Yes  Occupational Therapy Consult: Yes  Speech Therapy Consult: Yes  Current Support Network: Relative's Home(lives in father in law suite at his dtr's home)  Confirm Follow Up Transport: Family  The Plan for Transition of Care is Related to the Following Treatment Goals : Home health nursing and PT/OT services to improve pt's functional abilities s/p CVA  The Patient and/or Patient Representative was Provided with a Choice of Provider and Agrees with the Discharge Plan?: Yes  Freedom of Choice List was Provided with Basic Dialogue that Supports the Patient's Individualized Plan of Care/Goals, Treatment Preferences and Shares the Quality Data Associated with the Providers?: Yes  Discharge Location  Discharge Placement: Home with home health([facility])

## 2021-01-10 NOTE — PROGRESS NOTES
01/10/21 0648   NIH Stroke Scale   Interval Other (comment)  (Dual NIH w/ LUKE Rosales)   LOC 0   LOC Questions 0   LOC Commands 0   Best Gaze 0   Visual 0   Facial Palsy 0   Motor Right Arm 0   Motor Left Arm 0   Motor Right Leg 0   Motor Left Leg 0   Limb Ataxia 0   Sensory 0   Best Language 0   Dysarthria 0   Extinction and Inattention 0   Total 0

## 2021-01-10 NOTE — DISCHARGE SUMMARY
Hospitalist Discharge Summary     Admit Date:  2021 10:43 AM   Name:  Evaristo Benitez   Age:  80 y.o.  :  1931   MRN:  [id number]   PCP:  Rita Mcelroy MD  Treatment Team: Attending Provider: Suhail Porras MD; Primary Nurse: Lenny Roberson, RN; Consulting Provider: Gerson Ferreira DO; Utilization Review: Fawn Landry; Charge Nurse: Shashank Kenny Primary Nurse: Dat Booker    Problem List for this Hospitalization:  Hospital Problems as of 1/10/2021 Date Reviewed: 2020          Codes Class Noted - Resolved POA    * (Principal) TIA (transient ischemic attack) ICD-10-CM: G45.9  ICD-9-CM: 435.9  2021 - Present Yes                Admission HPI from 2021:    79 yo male w/ hx of HTN, LAZ and atrial fibrillation not on any blood thinner.  Woke at 6:30 AM and had breakfast in his father-in-law's week.  When his son-in-law came by around 8 he tried to speak with him and could not get his words out.  This lasted for maybe a couple minutes.  EMS was called out and patient brought here for possible TIA.  On EMS arrival patient was symptom-free.  Has remained symptom-free to the ER.  Denies any headache or blurry vision. No Chest pain or shortness of breath.  Is hypertensive with rapid A. fib in the ER room. On further questioning, his symptom lasted about 1 hour and resolved. No other symptoms reported. He was seen in ED by Neurology. MRI brain showed Small acute right parietal lobe infarct. Moderate chronic small vessel ischemic changes and remote infarctions, echo EF 55-60%, moderate to marked LAD, negative for PFO. The patient was seen by cardiology and Eliquis started. He is afebrile, no leukocytosis. He will follow up with neurology. Follow up instructions and discharge meds at bottom of this note. Plan was discussed with patient, daughter. All questions answered. Patient was stable at time of discharge.     10 systems reviewed and negative except as noted in HPI. Diagnostic Imaging/Tests:   Mri Brain Wo Cont    Result Date: 1/9/2021  MRI brain without contrast: 01/09/2021 History: Transient aphasia in the setting of A. fib with RVR. Imaging sequences: Sagittal short TR/short TE, axial short TR/short TE, long TR/long TE, FLAIR, gradient recall, diffusion weighted images and ADC mapping. Coronal FLAIR. Imaging was performed on a 1.5 Ruth magnet. Comparison: None. Correlation is made to the CT scan of the brain 01/08/2021. Findings: The ventricles are normal in size and configuration. There are no extra-axial fluid collections. Normal flow voids are present within all of the major intracranial vessels. No evidence of intraparenchymal hemorrhage or mass effect is identified. There is a small acute infarction within the right parietal lobe white matter. Patchy, confluent and discrete foci of T2 prolongation are present within the supratentorial white matter. These are nonspecific findings but would be most compatible with moderate chronic small vessel ischemic changes. Mild patchy T2 prolongation within the central navi is felt to represent part of the same process. Few remote left cerebellar and pontine lacunar infarctions are present. The visualized mastoid air cells and paranasal sinuses are well pneumatized and aerated. Impression: 1. Small acute right parietal lobe infarct. 2. Moderate chronic small vessel ischemic changes and remote infarctions as described. Cta Head Neck W Wo Cont    Result Date: 1/9/2021  History: Transient ischemic attack with expressive aphasia. FINDINGS: CT angiography was performed of the neck and head with contrast and three-dimensional CT angiography reconstruction and reformat was performed. NASCET criteria as needed. CT dose reduction was achieved through use of a standardized protocol tailored for this examination and automatic exposure control for dose modulation. Aortic arch is atherosclerotic but patent.  The left subclavian artery is patent. The innominate artery and right subclavian artery are patent. The left vertebral artery is patent. The right vertebral artery is patent. The left common carotid artery is patent. The left internal carotid artery is patent. The right common and right internal carotid arteries are patent. The basilar artery is patent. The left posterior communicating artery provides the dominant flow to the left posterior cerebral artery. The right posterior cerebral artery is patent. The right and left middle cerebral artery is patent. The anterior cerebral arteries are patent. IMPRESSION: No evidence of acute arterial occlusive disease. No hemodynamically significant carotid artery stenosis. Ct Code Neuro Head Wo Contrast    Result Date: 1/8/2021  CT head without contrast History: patient with acute neuro changes. Left-sided weakness, aphasia Technique: 5mm axial images were obtained from the skull base to the vertex without contrast. Radiation dose reduction techniques were used for this study: Our CT scanners use one or all of the following: Automated exposure control, adjustment of the mA and/or kVp according to patient's size, iterative reconstruction. Comparison: None. Findings: The ventricles and sulci are prominent but felt to be appropriate for age. There are no extra-axial fluid collections. No evidence of acute intraparenchymal hemorrhage or mass effect is identified. Patchy areas of decreased attenuation are noted within the supratentorial white matter. These are nonspecific findings but would be most compatible with moderate chronic small vessel ischemic changes. There is no evidence to suggest an acute major territorial infarct. The bony calvarium is intact. The visualized mastoid air cells and paranasal sinuses are well pneumatized and aerated. Impression: 1. Findings most compatible with moderate chronic small vessel ischemic changes.  2. Otherwise unremarkable unenhanced CT scan of the brain. Echocardiogram results:  Results for orders placed or performed during the hospital encounter of 21   2D ECHO COMPLETE ADULT (TTE) W OR WO CONTR    Narrative    Dejuan Coleman [address], [address] [facility]  [phone]    Transthoracic Echocardiogram  2D, M-mode, Doppler, and Color Doppler    Patient: Patsy Rivero  MR #: [id number]  : 26-Dec-1931  Age: 80 years  Gender: Male  Study date: 2021  Account #: [de-identified]  Height: 67 in  Weight: 205.5 lb  BSA: 2.05 mï¾²  Status:Routine  Location: [facility]  BP: 200/ 155    Allergies: NO KNOWN ALLERGENS    Sonographer:  Dago Borja [facility]  Group:  [id number] [facility]  Referring Physician:  Catrina Valle MD  Reading Physician:  Renita Perry. MD Rashaun [facility]    INDICATIONS: Assess left ventricular function. PROCEDURE: This was a routine study. A transthoracic echocardiogram was  performed. The study included complete 2D imaging, M-mode, complete spectral  Doppler, and color Doppler. Intravenous contrast (Definity) was administered. Intravenous contrast (agitated saline) was administered. Echocardiographic  views were limited by poor acoustic window availability. This was a   technically  difficult study. LEFT VENTRICLE: Size was normal. Systolic function was normal. Ejection  fraction was estimated in the range of 55 % to 60 %. Although no diagnostic  regional wall motion abnormality was identified, this possibility cannot be  completely excluded on the basis of this study. There was mild concentric  hypertrophy. Doppler parameters were consistent with diastolic dysfunction. Avg  E/e': 9.8. RIGHT VENTRICLE: The size was normal. Systolic function was normal. Estimated  peak pressure was in the range of 40-45 mmHg. LEFT ATRIUM: The atrium was moderately to markedly dilated. ATRIAL SEPTUM: Agitated saline contrast injection (bubble study) was   performed.   There was no right-to-left shunt, at rest or induced by the Valsalva   maneuver. RIGHT ATRIUM: The atrium was moderately dilated. SYSTEMIC VEINS: IVC: The inferior vena cava was dilated. The respirophasic  change in diameter was more than 50%. AORTIC VALVE: There was mild stenosis. There was mild regurgitation. MITRAL VALVE: There was moderate annular calcification. There was no evidence  for stenosis. There was trivial regurgitation. TRICUSPID VALVE: The valve structure was normal. There was no evidence for  stenosis. There was mild regurgitation. PULMONIC VALVE: Not well visualized. There was no evidence for stenosis. There  was mild regurgitation. PERICARDIUM: There was no pericardial effusion. AORTA: The root exhibited normal size. SUMMARY:    -  Left ventricle: Systolic function was normal. Ejection fraction was  estimated in the range of 55 % to 60 %. Although no diagnostic regional wall  motion abnormality was identified, this possibility cannot be completely  excluded on the basis of this study. There was mild concentric hypertrophy. -  Left atrium: The atrium was moderately to markedly dilated. -  Atrial septum: Agitated saline contrast injection (bubble study) was  performed. There was no right-to-left shunt, at rest or induced by the   Valsalva  maneuver. -  Right atrium: The atrium was moderately dilated. -  Inferior vena cava, hepatic veins: The inferior vena cava was dilated. The  respirophasic change in diameter was more than 50%. -  Aortic valve: There was mild stenosis. -  Mitral valve: There was moderate annular calcification.    -  Tricuspid valve: There was mild regurgitation.     SYSTEM MEASUREMENT TABLES    2D  Ao Diam: 3.8 cm  LA Diam: 4.1 cm  LAEDV Index (A-L): 51.5 ml/m2  %FS: 40.1 %  IVSd: 1.5 cm  LVIDd: 4.5 cm  LVIDs: 2.7 cm  LVOT Diam: 2.3 cm  LVPWd: 1.7 cm    CW  TR Vmax: 2.6 m/s  TR maxP.4 mmHg    PW  E/E' Av.8  E/E' Lat: 11.1  E/E' Sept: 8.7    Prepared and signed by    Tamika Rodney MD [facility]  Signed 08-Jan-2021 14:44:04           All Micro Results     None          Labs: Results:       BMP, Mg, Phos Recent Labs     01/08/21  1052      K 3.6      CO2 29   AGAP 5*   BUN 17   CREA 1.16   CA 9.1   *      CBC Recent Labs     01/08/21  1052   WBC 6.2   RBC 4.37   HGB 14.1   HCT 42.1   *   GRANS 59   LYMPH 29   EOS 2   MONOS 8   BASOS 1   IG 0   ANEU 3.7   ABL 1.8   NIKKI 0.1   ABM 0.5   ABB 0.1   AIG 0.0      LFT No results for input(s): ALT, TBIL, AP, TP, ALB, GLOB, AGRAT in the last 72 hours.     No lab exists for component: SGOT, GPT   Cardiac Testing No results found for: BNPP, BNP, CPK, RCK1, RCK2, RCK3, RCK4, CKMB, CKNDX, CKND1, TROPT, TROIQ   Coagulation Tests Lab Results   Component Value Date/Time    Prothrombin time 16.3 (H) 01/08/2021 10:52 AM    INR 1.3 01/08/2021 10:52 AM    INR (POC) 1.4 (H) 01/08/2021 10:54 AM      A1c Lab Results   Component Value Date/Time    Hemoglobin A1c 5.5 01/08/2021 10:52 AM      Lipid Panel Lab Results   Component Value Date/Time    Cholesterol, total 119 01/09/2021 05:30 AM    HDL Cholesterol 57 01/09/2021 05:30 AM    LDL, calculated 55 01/09/2021 05:30 AM    VLDL, calculated 7 01/09/2021 05:30 AM    Triglyceride 35 01/09/2021 05:30 AM    CHOL/HDL Ratio 2.1 01/09/2021 05:30 AM      Thyroid Panel Lab Results   Component Value Date/Time    TSH 1.910 01/08/2021 10:52 AM        Most Recent UA No results found for: COLOR, APPRN, REFSG, NORMA, PROTU, GLUCU, KETU, BILU, BLDU, UROU, VALE, LEUKU     No Known Allergies  Immunization History   Administered Date(s) Administered    Influenza High Dose Vaccine PF 10/03/2017, 10/03/2019    Influenza Vaccine 09/01/2010    Influenza Vaccine (Quad) PF (>6 Mo Flulaval, Fluarix, and >3 Yrs Afluria, Fluzone [id number]) 09/14/2018    Influenza, Quadrivalent, Adjuvanted (>65 Yrs FLUAD QUAD [id number]) 10/26/2020    Pneumococcal Conjugate (PCV-13) 10/03/2017    Pneumococcal Vaccine (Unspecified Type) 01/01/2005    TB Skin Test (PPD) Intradermal 01/08/2021    Tdap 10/03/2017    Zoster Vaccine, Live 10/01/2012       All Labs from Last 24 Hrs:  Recent Results (from the past 24 hour(s))   PLEASE READ & DOCUMENT PPD TEST IN 24 HRS    Collection Time: 01/09/21  6:33 PM   Result Value Ref Range    PPD Negative Negative    mm Induration 0 0 - 5 mm       Discharge Exam:  Patient Vitals for the past 24 hrs:   Temp Pulse Resp BP SpO2   01/10/21 0400 97.7 °F (36.5 °C) 79 18 (!) 158/97 99 %   01/10/21 0000 97.8 °F (36.6 °C) (!) 105 18 (!) 162/90 98 %   01/09/21 2000 97.8 °F (36.6 °C) 92 18 (!) 169/98 96 %   01/09/21 1502 98 °F (36.7 °C) 96 18 (!) 167/105 94 %   01/09/21 1257 -- 87 -- (!) 149/88 --   01/09/21 1200 98.5 °F (36.9 °C) (!) 112 18 (!) 185/110 95 %     Oxygen Therapy  O2 Sat (%): 99 % (01/10/21 0400)  Pulse via Oximetry: 69 beats per minute (01/08/21 1714)  O2 Device: Room air (01/08/21 1118)  No intake or output data in the 24 hours ending 01/10/21 1034      Physical exam:  General:    Well nourished. Alert. No distress. Eyes:   Normal sclera. Extraocular movements intact. ENT:  Normocephalic, atraumatic. Moist mucous membranes  CV:   Regular rate and rhythm. No murmur, rub, or gallop. Lungs:  Clear to auscultation bilaterally. No wheezing, rhonchi, or rales. Abdomen: Soft, nontender, nondistended. Bowel sounds normal.   Extremities: Warm and dry. No cyanosis or edema. Neurologic: No focal deficits  Skin:     No rashes or jaundice. Psych:  Normal mood and affect. Discharge Info:   Current Discharge Medication List      START taking these medications    Details   apixaban (ELIQUIS) 5 mg tablet Take 1 Tab by mouth two (2) times a day for 30 days. Qty: 60 Tab, Refills: 0      metoprolol tartrate (LOPRESSOR) 25 mg tablet Take 1 Tab by mouth two (2) times a day for 30 days.   Qty: 60 Tab, Refills: 0      atorvastatin (LIPITOR) 80 mg tablet Take 1 Tab by mouth daily for 30 days. Qty: 30 Tab, Refills: 0         CONTINUE these medications which have NOT CHANGED    Details   OTHER Walker with wheels and a seat  Qty: 1 Tab, Refills: 0    Associated Diagnoses: NSVT (nonsustained ventricular tachycardia) ([facility] [address] Arthritis of knee      vit C/E/Zn/coppr/lutein/zeaxan (PRESERVISION AREDS-2 PO) Take  by mouth.      metoprolol succinate (TOPROL-XL) 25 mg XL tablet Take 1 Tab by mouth two (2) times a day. Qty: 180 Tab, Refills: 3      finasteride (PROSCAR) 5 mg tablet Take 1 Tab by mouth daily. Qty: 90 Tab, Refills: 3      multivitamin (ONE A DAY) tablet Take 1 Tab by mouth daily. tamsulosin (FLOMAX) 0.4 mg capsule TAKE 1 CAPSULE BY MOUTH EVERY DAY FOR 14 DAYS      vitamin B complex (Super B-50 Complex) capsule       co-enzyme Q-10 (CO Q-10) 100 mg capsule       aspirin delayed-release 81 mg tablet Take  by mouth daily. turmeric root extract 500 mg cap Take  by mouth. cholecalciferol (VITAMIN D3) 1,000 unit cap Take  by mouth daily. loratadine 10 mg cap Take  by mouth. Omega-3-DHA-EPA-Fish Oil 1,200 (144-216) mg cap Take 1 Cap by mouth three (3) times daily. STOP taking these medications       simvastatin (ZOCOR) 20 mg tablet Comments:   Reason for Stopping:                 Disposition: home    Activity: Activity as tolerated  Diet: DIET CARDIAC Regular    Follow-up Appointments   Procedures    FOLLOW UP VISIT Appointment in: Other (Specify) Neurology Follow up in 3-4 weeks after discharge with Dr. Juan Luis Pizarro or NP in the outpatient clinic. Neurology Follow up in 3-4 weeks after discharge with Dr. Juan Luis Pizarro or NP in the outpatient clinic. Standing Status:   Standing     Number of Occurrences:   1     Order Specific Question:   Appointment in     Answer:    Other (Specify)         Follow-up Information     Follow up With Specialties Details Why Contact Info    Sharona Khan MD Family Medicine   [address] [place] [id number]  [phone]      [facility] NEUROLOGY [facility]  Schedule an appointment as soon as possible for a visit follow up 3-4 weeks  [address] [place] [id number] [phone]              Time spent in patient discharge planning and coordination 35 minutes.    Signed:  Yoli Henley NP

## 2021-01-10 NOTE — PROGRESS NOTES
Neurology Daily Progress Note     Assessment:     Acute ischemic stroke, right parietal lobe. Code S presented with transient expressive aphasia in the setting of A.fib with RVR. TTE EF 55-60%, moderate to marked LAD, negative for PFO. He is back to his neurological baseline. CTA of head/neck negative for LVO or high grade stenosis. MRI showed small acute right parietal lobe infarct. Cardiology following for A.fib, started on Eliquis. No additional neurological workup is needed at this time. He can follow up with Dr. Alfonso Palencia or NP in the outpatient clinic in 3-4 weeks after discharge. Plan:     · Continue Eliquis 5 mg po BID. Defer to cardiology for management. · Continue high intensity statin   · Neurochecks Q4H  · Telemetry  · PT/OT/ST  · DVT prophylaxis   · BP management - normotensive, with long-term goal <140/90  · Smoking cessation if applicable   · Diabetes education if applicable   · Depression Screening prior to discharge      Subjective: Interval history:    No complaints. Sitting up in recliner. CTA of head/neck negative for LVO or high grade stenosis. MRI showed small acute right parietal lobe infarct. History:    Hesham Olivares is a 80 y.o. male who is being seen for Code S presented with transient expressive aphasia. Review of systems negative with exception of pertinent positives and negatives noted above.        Objective:     Vitals:    01/09/21 1502 01/09/21 2000 01/10/21 0000 01/10/21 0400   BP: (!) 167/105 (!) 169/98 (!) 162/90 (!) 158/97   Pulse: 96 92 (!) 105 79   Resp: 18 18 18 18   Temp: 98 °F (36.7 °C) 97.8 °F (36.6 °C) 97.8 °F (36.6 °C) 97.7 °F (36.5 °C)   SpO2: 94% 96% 98% 99%   Weight:       Height:              Current Facility-Administered Medications:     apixaban (ELIQUIS) tablet 5 mg, 5 mg, Oral, BID, JosephAgusto MD, 5 mg at 01/10/21 0830    tamsulosin (FLOMAX) capsule 0.4 mg, 0.4 mg, Oral, DAILY, Halina Washburn MD, 0.4 mg at 01/10/21 0830    finasteride (PROSCAR) tablet 5 mg, 5 mg, Oral, DAILY, Juan Martell MD, 5 mg at 01/10/21 0830  •  atorvastatin (LIPITOR) tablet 80 mg, 80 mg, Oral, DAILY, Juan Martell MD, 80 mg at 01/10/21 0830  •  sodium chloride (NS) flush 5-40 mL, 5-40 mL, IntraVENous, Q8H, Juan Martell MD, 5 mL at 01/10/21 0523  •  sodium chloride (NS) flush 5-40 mL, 5-40 mL, IntraVENous, PRN, Juan Martell MD  •  ondansetron (ZOFRAN) injection 4 mg, 4 mg, IntraVENous, Q6H PRN, Juan Martell MD  •  acetaminophen (TYLENOL) tablet 650 mg, 650 mg, Oral, Q4H PRN, Juan Martell MD, 650 mg at 01/09/21 0200  •  metoprolol tartrate (LOPRESSOR) tablet 25 mg, 25 mg, Oral, BID, Juan Martell MD, 25 mg at 01/10/21 0830    No results found for this or any previous visit (from the past 12 hour(s)).  CT Results (most recent):  Results from Hospital Encounter encounter on 01/08/21   CTA HEAD NECK W WO CONT    Narrative History: Transient ischemic attack with expressive aphasia.    FINDINGS:    CT angiography was performed of the neck and head with contrast and  three-dimensional CT angiography reconstruction and reformat was performed.  NASCET criteria as needed. CT dose reduction was achieved through use of a  standardized protocol tailored for this examination and automatic exposure  control for dose modulation.     Aortic arch is atherosclerotic but patent. The left subclavian artery is patent.  The innominate artery and right subclavian artery are patent. The left vertebral  artery is patent. The right vertebral artery is patent. The left common carotid  artery is patent. The left internal carotid artery is patent. The right common  and right internal carotid arteries are patent.    The basilar artery is patent. The left posterior communicating artery provides  the dominant flow to the left posterior cerebral artery. The right posterior  cerebral artery is patent. The right and left middle cerebral artery is patent.  The anterior cerebral  arteries are patent. Impression IMPRESSION:    No evidence of acute arterial occlusive disease. No hemodynamically significant  carotid artery stenosis. MRI Results (most recent):  [facility]   MRI BRAIN WO CONT    Narrative MRI brain without contrast: 01/09/2021    History: Transient aphasia in the setting of A. fib with RVR. Imaging sequences: Sagittal short TR/short TE, axial short TR/short TE, long  TR/long TE, FLAIR, gradient recall, diffusion weighted images and ADC mapping. Coronal FLAIR. Imaging was performed on a 1.5 Ruth magnet. Comparison: None. Correlation is made to the CT scan of the brain 01/08/2021. Findings: The ventricles are normal in size and configuration. There are no  extra-axial fluid collections. Normal flow voids are present within all of the  major intracranial vessels. No evidence of intraparenchymal hemorrhage or mass  effect is identified. There is a small acute infarction within the right  parietal lobe white matter. Patchy, confluent and discrete foci of T2 prolongation are present within the  supratentorial white matter. These are nonspecific findings but would be most  compatible with moderate chronic small vessel ischemic changes. Mild patchy T2  prolongation within the central navi is felt to represent part of the same  process. Few remote left cerebellar and pontine lacunar infarctions are present. The visualized mastoid air cells and paranasal sinuses are well pneumatized and  aerated. Impression Impression:   1. Small acute right parietal lobe infarct. 2. Moderate chronic small vessel ischemic changes and remote infarctions as  described. Physical Exam:  General - Well developed, well nourished, in no apparent distress. Pleasant and conversent. HEENT - Normocephalic, atraumatic. Conjunctiva are clear. Neck - Supple without masses  Cardiovascular - irregular rate and rhythm.  Normal S1, S2 without murmurs, rubs, or gallops. Lungs - Clear to auscultation. Abdomen - Soft, nontender with normal bowel sounds. Extremities - Peripheral pulses intact. No edema and no rashes. Neurological examination - Comprehension, attention, memory and reasoning are intact. Language and speech are normal.   On cranial nerve examination, (II, III, IV, VI) pupils are equal, round, and reactive to light. Visual acuity is adequate. Visual fields are full to finger confrontation. Extraocular motility is normal. (V, VII) Face is symmetric and sensation is intact to light touch. (VIII) Hearing is intact. (IX, X) Palate and uvula elevate symmetrically. Voice is normal. (XI) Shoulder shrug is strong and equal bilaterally. (XII)Tongue is midline. Motor examination - There is normal muscle tone and bulk. Power is full throughout. Muscle stretch reflexes are normoactive and there are no pathological reflexes present. Plantar response is flexor. Sensation is intact to light touch, pinprick, vibration and proprioception in all extremities.  Cerebellar examination is normal.     Signed By: Tyron Gonzalez NP     January 10, 2021

## 2021-01-11 ENCOUNTER — HOME HEALTH ADMISSION (OUTPATIENT)
Dept: HOME HEALTH SERVICES | Facility: HOME HEALTH | Age: 86
End: 2021-01-11
Payer: MEDICARE

## 2021-01-12 ENCOUNTER — HOME CARE VISIT (OUTPATIENT)
Dept: SCHEDULING | Facility: HOME HEALTH | Age: 86
End: 2021-01-12
Payer: MEDICARE

## 2021-01-12 VITALS
TEMPERATURE: 97.7 F | SYSTOLIC BLOOD PRESSURE: 144 MMHG | RESPIRATION RATE: 12 BRPM | OXYGEN SATURATION: 97 % | DIASTOLIC BLOOD PRESSURE: 86 MMHG | HEART RATE: 80 BPM

## 2021-01-12 PROCEDURE — 400018 HH-NO PAY CLAIM PROCEDURE

## 2021-01-12 PROCEDURE — 3331090002 HH PPS REVENUE DEBIT

## 2021-01-12 PROCEDURE — 400013 HH SOC

## 2021-01-12 PROCEDURE — 3331090001 HH PPS REVENUE CREDIT

## 2021-01-12 PROCEDURE — G0495 RN CARE TRAIN/EDU IN HH: HCPCS

## 2021-01-13 ENCOUNTER — HOME CARE VISIT (OUTPATIENT)
Dept: SCHEDULING | Facility: HOME HEALTH | Age: 86
End: 2021-01-13
Payer: MEDICARE

## 2021-01-13 VITALS
OXYGEN SATURATION: 98 % | TEMPERATURE: 97.7 F | SYSTOLIC BLOOD PRESSURE: 160 MMHG | DIASTOLIC BLOOD PRESSURE: 90 MMHG | RESPIRATION RATE: 18 BRPM | HEART RATE: 76 BPM

## 2021-01-13 PROCEDURE — 3331090002 HH PPS REVENUE DEBIT

## 2021-01-13 PROCEDURE — 3331090001 HH PPS REVENUE CREDIT

## 2021-01-13 PROCEDURE — G0152 HHCP-SERV OF OT,EA 15 MIN: HCPCS

## 2021-01-14 ENCOUNTER — HOME CARE VISIT (OUTPATIENT)
Dept: SCHEDULING | Facility: HOME HEALTH | Age: 86
End: 2021-01-14
Payer: MEDICARE

## 2021-01-14 PROCEDURE — 3331090002 HH PPS REVENUE DEBIT

## 2021-01-14 PROCEDURE — 3331090001 HH PPS REVENUE CREDIT

## 2021-01-14 PROCEDURE — G0151 HHCP-SERV OF PT,EA 15 MIN: HCPCS

## 2021-01-15 ENCOUNTER — HOME CARE VISIT (OUTPATIENT)
Dept: SCHEDULING | Facility: HOME HEALTH | Age: 86
End: 2021-01-15
Payer: MEDICARE

## 2021-01-15 VITALS
RESPIRATION RATE: 17 BRPM | SYSTOLIC BLOOD PRESSURE: 126 MMHG | TEMPERATURE: 97.3 F | HEART RATE: 66 BPM | DIASTOLIC BLOOD PRESSURE: 78 MMHG

## 2021-01-15 PROCEDURE — 3331090002 HH PPS REVENUE DEBIT

## 2021-01-15 PROCEDURE — 3331090001 HH PPS REVENUE CREDIT

## 2021-01-15 PROCEDURE — G0299 HHS/HOSPICE OF RN EA 15 MIN: HCPCS

## 2021-01-16 VITALS
TEMPERATURE: 97.8 F | SYSTOLIC BLOOD PRESSURE: 122 MMHG | DIASTOLIC BLOOD PRESSURE: 90 MMHG | RESPIRATION RATE: 12 BRPM | HEART RATE: 66 BPM | OXYGEN SATURATION: 96 %

## 2021-01-16 PROCEDURE — 3331090002 HH PPS REVENUE DEBIT

## 2021-01-16 PROCEDURE — 3331090001 HH PPS REVENUE CREDIT

## 2021-01-17 PROCEDURE — 3331090001 HH PPS REVENUE CREDIT

## 2021-01-17 PROCEDURE — 3331090002 HH PPS REVENUE DEBIT

## 2021-01-18 ENCOUNTER — HOME CARE VISIT (OUTPATIENT)
Dept: HOME HEALTH SERVICES | Facility: HOME HEALTH | Age: 86
End: 2021-01-18
Payer: MEDICARE

## 2021-01-18 PROCEDURE — 3331090002 HH PPS REVENUE DEBIT

## 2021-01-18 PROCEDURE — 3331090001 HH PPS REVENUE CREDIT

## 2021-01-19 PROCEDURE — 3331090002 HH PPS REVENUE DEBIT

## 2021-01-19 PROCEDURE — 3331090001 HH PPS REVENUE CREDIT

## 2021-01-20 PROCEDURE — 3331090002 HH PPS REVENUE DEBIT

## 2021-01-20 PROCEDURE — 3331090001 HH PPS REVENUE CREDIT

## 2021-01-21 ENCOUNTER — HOME CARE VISIT (OUTPATIENT)
Dept: SCHEDULING | Facility: HOME HEALTH | Age: 86
End: 2021-01-21
Payer: MEDICARE

## 2021-01-21 VITALS
TEMPERATURE: 97.6 F | OXYGEN SATURATION: 97 % | HEART RATE: 72 BPM | RESPIRATION RATE: 18 BRPM | DIASTOLIC BLOOD PRESSURE: 82 MMHG | SYSTOLIC BLOOD PRESSURE: 152 MMHG

## 2021-01-21 PROCEDURE — 3331090002 HH PPS REVENUE DEBIT

## 2021-01-21 PROCEDURE — G0299 HHS/HOSPICE OF RN EA 15 MIN: HCPCS

## 2021-01-21 PROCEDURE — 3331090001 HH PPS REVENUE CREDIT

## 2021-01-22 PROCEDURE — 3331090001 HH PPS REVENUE CREDIT

## 2021-01-22 PROCEDURE — 3331090002 HH PPS REVENUE DEBIT

## 2021-01-23 PROCEDURE — 3331090001 HH PPS REVENUE CREDIT

## 2021-01-23 PROCEDURE — 3331090002 HH PPS REVENUE DEBIT

## 2021-01-24 PROCEDURE — 3331090001 HH PPS REVENUE CREDIT

## 2021-01-24 PROCEDURE — 3331090002 HH PPS REVENUE DEBIT

## 2021-01-25 PROCEDURE — 3331090001 HH PPS REVENUE CREDIT

## 2021-01-25 PROCEDURE — 3331090002 HH PPS REVENUE DEBIT

## 2021-01-26 PROCEDURE — 3331090002 HH PPS REVENUE DEBIT

## 2021-01-26 PROCEDURE — 3331090001 HH PPS REVENUE CREDIT

## 2021-01-27 PROCEDURE — 3331090001 HH PPS REVENUE CREDIT

## 2021-01-27 PROCEDURE — 3331090002 HH PPS REVENUE DEBIT

## 2021-01-28 PROCEDURE — 3331090001 HH PPS REVENUE CREDIT

## 2021-01-28 PROCEDURE — 3331090002 HH PPS REVENUE DEBIT

## 2021-01-29 ENCOUNTER — HOME CARE VISIT (OUTPATIENT)
Dept: SCHEDULING | Facility: HOME HEALTH | Age: 86
End: 2021-01-29
Payer: MEDICARE

## 2021-01-29 VITALS
OXYGEN SATURATION: 99 % | DIASTOLIC BLOOD PRESSURE: 96 MMHG | HEART RATE: 74 BPM | SYSTOLIC BLOOD PRESSURE: 154 MMHG | TEMPERATURE: 98.7 F | RESPIRATION RATE: 17 BRPM

## 2021-01-29 PROCEDURE — 3331090002 HH PPS REVENUE DEBIT

## 2021-01-29 PROCEDURE — 3331090001 HH PPS REVENUE CREDIT

## 2021-01-29 PROCEDURE — G0299 HHS/HOSPICE OF RN EA 15 MIN: HCPCS

## 2021-01-30 PROCEDURE — 3331090001 HH PPS REVENUE CREDIT

## 2021-01-30 PROCEDURE — 3331090002 HH PPS REVENUE DEBIT

## 2021-01-31 PROCEDURE — 3331090002 HH PPS REVENUE DEBIT

## 2021-01-31 PROCEDURE — 3331090001 HH PPS REVENUE CREDIT

## 2021-02-01 ENCOUNTER — HOME CARE VISIT (OUTPATIENT)
Dept: SCHEDULING | Facility: HOME HEALTH | Age: 86
End: 2021-02-01
Payer: MEDICARE

## 2021-02-01 VITALS
HEART RATE: 82 BPM | TEMPERATURE: 98.2 F | OXYGEN SATURATION: 99 % | RESPIRATION RATE: 17 BRPM | SYSTOLIC BLOOD PRESSURE: 164 MMHG | DIASTOLIC BLOOD PRESSURE: 110 MMHG

## 2021-02-01 PROCEDURE — 3331090001 HH PPS REVENUE CREDIT

## 2021-02-01 PROCEDURE — G0299 HHS/HOSPICE OF RN EA 15 MIN: HCPCS

## 2021-02-01 PROCEDURE — 3331090002 HH PPS REVENUE DEBIT

## 2021-02-02 PROCEDURE — 3331090002 HH PPS REVENUE DEBIT

## 2021-02-02 PROCEDURE — 3331090001 HH PPS REVENUE CREDIT

## 2021-02-03 PROCEDURE — 3331090001 HH PPS REVENUE CREDIT

## 2021-02-03 PROCEDURE — 3331090002 HH PPS REVENUE DEBIT

## 2021-02-04 PROCEDURE — 3331090002 HH PPS REVENUE DEBIT

## 2021-02-04 PROCEDURE — 3331090001 HH PPS REVENUE CREDIT

## 2021-02-05 ENCOUNTER — HOME CARE VISIT (OUTPATIENT)
Dept: SCHEDULING | Facility: HOME HEALTH | Age: 86
End: 2021-02-05
Payer: MEDICARE

## 2021-02-05 VITALS
SYSTOLIC BLOOD PRESSURE: 114 MMHG | DIASTOLIC BLOOD PRESSURE: 92 MMHG | RESPIRATION RATE: 17 BRPM | TEMPERATURE: 98.4 F | HEART RATE: 64 BPM | OXYGEN SATURATION: 99 %

## 2021-02-05 PROCEDURE — 3331090002 HH PPS REVENUE DEBIT

## 2021-02-05 PROCEDURE — G0299 HHS/HOSPICE OF RN EA 15 MIN: HCPCS

## 2021-02-05 PROCEDURE — 3331090001 HH PPS REVENUE CREDIT

## 2021-02-06 PROCEDURE — 3331090002 HH PPS REVENUE DEBIT

## 2021-02-06 PROCEDURE — 3331090001 HH PPS REVENUE CREDIT

## 2021-02-07 PROCEDURE — 3331090002 HH PPS REVENUE DEBIT

## 2021-02-07 PROCEDURE — 3331090001 HH PPS REVENUE CREDIT

## 2021-02-08 PROCEDURE — 3331090001 HH PPS REVENUE CREDIT

## 2021-02-08 PROCEDURE — 3331090002 HH PPS REVENUE DEBIT

## 2021-02-09 PROCEDURE — 3331090001 HH PPS REVENUE CREDIT

## 2021-02-09 PROCEDURE — 3331090002 HH PPS REVENUE DEBIT

## 2021-02-10 PROCEDURE — 3331090001 HH PPS REVENUE CREDIT

## 2021-02-10 PROCEDURE — 3331090002 HH PPS REVENUE DEBIT

## 2021-02-11 PROCEDURE — 400018 HH-NO PAY CLAIM PROCEDURE

## 2021-02-11 PROCEDURE — 3331090001 HH PPS REVENUE CREDIT

## 2021-02-11 PROCEDURE — 3331090002 HH PPS REVENUE DEBIT

## 2021-02-12 ENCOUNTER — HOME CARE VISIT (OUTPATIENT)
Dept: SCHEDULING | Facility: HOME HEALTH | Age: 86
End: 2021-02-12
Payer: MEDICARE

## 2021-02-12 PROCEDURE — 3331090001 HH PPS REVENUE CREDIT

## 2021-02-12 PROCEDURE — 400013 HH SOC

## 2021-02-12 PROCEDURE — 3331090002 HH PPS REVENUE DEBIT

## 2021-02-12 PROCEDURE — G0299 HHS/HOSPICE OF RN EA 15 MIN: HCPCS

## 2021-02-13 PROCEDURE — 3331090002 HH PPS REVENUE DEBIT

## 2021-02-13 PROCEDURE — 3331090001 HH PPS REVENUE CREDIT

## 2021-02-14 VITALS
TEMPERATURE: 98.4 F | DIASTOLIC BLOOD PRESSURE: 88 MMHG | SYSTOLIC BLOOD PRESSURE: 148 MMHG | OXYGEN SATURATION: 98 % | RESPIRATION RATE: 17 BRPM | HEART RATE: 76 BPM

## 2021-02-14 PROCEDURE — 3331090001 HH PPS REVENUE CREDIT

## 2021-02-14 PROCEDURE — 3331090002 HH PPS REVENUE DEBIT

## 2021-02-15 PROCEDURE — 3331090001 HH PPS REVENUE CREDIT

## 2021-02-15 PROCEDURE — 3331090002 HH PPS REVENUE DEBIT

## 2021-02-16 PROCEDURE — 3331090001 HH PPS REVENUE CREDIT

## 2021-02-16 PROCEDURE — 3331090002 HH PPS REVENUE DEBIT

## 2021-02-17 PROCEDURE — 3331090001 HH PPS REVENUE CREDIT

## 2021-02-17 PROCEDURE — 3331090002 HH PPS REVENUE DEBIT

## 2021-02-18 ENCOUNTER — HOME CARE VISIT (OUTPATIENT)
Dept: SCHEDULING | Facility: HOME HEALTH | Age: 86
End: 2021-02-18
Payer: MEDICARE

## 2021-02-18 PROCEDURE — 3331090001 HH PPS REVENUE CREDIT

## 2021-02-18 PROCEDURE — 3331090002 HH PPS REVENUE DEBIT

## 2021-02-18 PROCEDURE — G0299 HHS/HOSPICE OF RN EA 15 MIN: HCPCS

## 2021-02-19 VITALS
TEMPERATURE: 98.2 F | SYSTOLIC BLOOD PRESSURE: 148 MMHG | DIASTOLIC BLOOD PRESSURE: 88 MMHG | HEART RATE: 76 BPM | RESPIRATION RATE: 17 BRPM | OXYGEN SATURATION: 98 %

## 2021-02-19 PROCEDURE — 3331090002 HH PPS REVENUE DEBIT

## 2021-02-19 PROCEDURE — 3331090001 HH PPS REVENUE CREDIT

## 2021-02-20 PROCEDURE — 3331090002 HH PPS REVENUE DEBIT

## 2021-02-20 PROCEDURE — 3331090001 HH PPS REVENUE CREDIT

## 2021-02-21 PROCEDURE — 3331090002 HH PPS REVENUE DEBIT

## 2021-02-21 PROCEDURE — 3331090001 HH PPS REVENUE CREDIT

## 2021-02-22 PROCEDURE — 3331090001 HH PPS REVENUE CREDIT

## 2021-02-22 PROCEDURE — 3331090002 HH PPS REVENUE DEBIT

## 2021-02-23 PROCEDURE — 3331090001 HH PPS REVENUE CREDIT

## 2021-02-23 PROCEDURE — 3331090002 HH PPS REVENUE DEBIT

## 2021-02-24 ENCOUNTER — HOME CARE VISIT (OUTPATIENT)
Dept: SCHEDULING | Facility: HOME HEALTH | Age: 86
End: 2021-02-24
Payer: MEDICARE

## 2021-02-24 PROCEDURE — G0299 HHS/HOSPICE OF RN EA 15 MIN: HCPCS

## 2021-02-24 PROCEDURE — 3331090001 HH PPS REVENUE CREDIT

## 2021-02-24 PROCEDURE — 3331090002 HH PPS REVENUE DEBIT

## 2021-02-25 VITALS
OXYGEN SATURATION: 98 % | SYSTOLIC BLOOD PRESSURE: 138 MMHG | HEART RATE: 63 BPM | DIASTOLIC BLOOD PRESSURE: 92 MMHG | TEMPERATURE: 98.3 F

## 2021-02-25 PROCEDURE — 3331090001 HH PPS REVENUE CREDIT

## 2021-02-25 PROCEDURE — 3331090002 HH PPS REVENUE DEBIT

## 2022-03-18 PROBLEM — I47.29 NSVT (NONSUSTAINED VENTRICULAR TACHYCARDIA): Status: ACTIVE | Noted: 2018-10-30

## 2022-03-18 PROBLEM — H91.90 HOH (HARD OF HEARING): Status: ACTIVE | Noted: 2019-02-26

## 2022-03-18 PROBLEM — Z78.9 DIFFICULTY WITH CPAP FULL FACE MASK USE: Status: ACTIVE | Noted: 2019-04-22

## 2022-03-18 PROBLEM — R00.1 BRADYCARDIA: Status: ACTIVE | Noted: 2018-10-05

## 2022-03-19 PROBLEM — K86.2 PANCREATIC CYST: Status: ACTIVE | Noted: 2019-04-26

## 2022-03-19 PROBLEM — I48.0 PAROXYSMAL ATRIAL FIBRILLATION (HCC): Status: ACTIVE | Noted: 2020-08-10

## 2022-03-19 PROBLEM — R22.31 MASS OF ARM, RIGHT: Status: ACTIVE | Noted: 2020-11-10

## 2022-03-19 PROBLEM — G47.33 OSA (OBSTRUCTIVE SLEEP APNEA): Status: ACTIVE | Noted: 2019-02-26

## 2022-03-19 PROBLEM — M17.10 ARTHRITIS OF KNEE: Status: ACTIVE | Noted: 2020-10-26

## 2022-03-19 PROBLEM — G45.9 TIA (TRANSIENT ISCHEMIC ATTACK): Status: ACTIVE | Noted: 2021-01-08

## 2022-09-27 RX ORDER — ATORVASTATIN CALCIUM 80 MG/1
TABLET, FILM COATED ORAL
Qty: 90 TABLET | Refills: 3 | Status: SHIPPED | OUTPATIENT
Start: 2022-09-27 | End: 2022-10-04 | Stop reason: SDUPTHER

## 2022-10-04 ENCOUNTER — OFFICE VISIT (OUTPATIENT)
Dept: CARDIOLOGY CLINIC | Age: 87
End: 2022-10-04
Payer: COMMERCIAL

## 2022-10-04 VITALS
BODY MASS INDEX: 33.75 KG/M2 | HEART RATE: 68 BPM | HEIGHT: 66 IN | DIASTOLIC BLOOD PRESSURE: 82 MMHG | WEIGHT: 210 LBS | SYSTOLIC BLOOD PRESSURE: 150 MMHG

## 2022-10-04 DIAGNOSIS — I48.0 PAROXYSMAL ATRIAL FIBRILLATION (HCC): ICD-10-CM

## 2022-10-04 DIAGNOSIS — I47.29 NSVT (NONSUSTAINED VENTRICULAR TACHYCARDIA): Primary | ICD-10-CM

## 2022-10-04 DIAGNOSIS — G47.33 OSA (OBSTRUCTIVE SLEEP APNEA): ICD-10-CM

## 2022-10-04 DIAGNOSIS — I71.21 ANEURYSM OF ASCENDING AORTA WITHOUT RUPTURE: ICD-10-CM

## 2022-10-04 DIAGNOSIS — I65.23 BILATERAL CAROTID ARTERY STENOSIS: ICD-10-CM

## 2022-10-04 DIAGNOSIS — I10 ESSENTIAL HYPERTENSION: ICD-10-CM

## 2022-10-04 PROCEDURE — 1123F ACP DISCUSS/DSCN MKR DOCD: CPT | Performed by: INTERNAL MEDICINE

## 2022-10-04 PROCEDURE — 99214 OFFICE O/P EST MOD 30 MIN: CPT | Performed by: INTERNAL MEDICINE

## 2022-10-04 RX ORDER — FINASTERIDE 5 MG/1
5 TABLET, FILM COATED ORAL DAILY
Qty: 90 TABLET | Refills: 3 | Status: SHIPPED | OUTPATIENT
Start: 2022-10-04 | End: 2022-10-24 | Stop reason: SDUPTHER

## 2022-10-04 RX ORDER — ATORVASTATIN CALCIUM 80 MG/1
TABLET, FILM COATED ORAL
Qty: 90 TABLET | Refills: 3 | Status: SHIPPED | OUTPATIENT
Start: 2022-10-04

## 2022-10-04 RX ORDER — AMLODIPINE BESYLATE 5 MG/1
5 TABLET ORAL DAILY
Qty: 90 TABLET | Refills: 3 | Status: SHIPPED | OUTPATIENT
Start: 2022-10-04

## 2022-10-04 RX ORDER — LISINOPRIL 40 MG/1
40 TABLET ORAL DAILY
Qty: 90 TABLET | Refills: 3 | Status: SHIPPED | OUTPATIENT
Start: 2022-10-04

## 2022-10-04 RX ORDER — METOPROLOL TARTRATE 50 MG/1
50 TABLET, FILM COATED ORAL 2 TIMES DAILY
Qty: 180 TABLET | Refills: 3 | Status: SHIPPED | OUTPATIENT
Start: 2022-10-04

## 2022-10-04 NOTE — PROGRESS NOTES
[address], [address]  PHONE: [phone]     10/04/22    NAME:  Niki Curtis  : 1931  MRN: [id number]       SUBJECTIVE:   Niki Curtis is a 80 y.o. male seen for a follow up visit regarding the following:     Chief Complaint   Patient presents with    Valvular Heart Disease       HPI:  Here for NSVT, Afib. Had been followed for Ao aneurysm,  echo in [place] showed Ascending aorta of 4.6cm and normal EF. Echo 2017 [place]:  Normal EF, Ao 4.2cm. TIA 2021  - NOAC started. Echo 2021: normal EF, mild AS        Home -160s now, better now, Diet better now. Feeling well now. Using walker. No falls. No CP, FORBES ok now. some knee pains, not walking great now. Doing well on anticoagulation treatment as reviewed today, no bleeding issues or excessive bruising noted. Patient denies recent history of orthopnea, PND, excessive dizziness and/or syncope. Past Medical History, Past Surgical History, Family history, Social History, and Medications were all reviewed with the patient today and updated as necessary.      Current Outpatient Medications   Medication Sig Dispense Refill    atorvastatin (LIPITOR) 80 MG tablet TAKE 1 TABLET EVERY DAY 90 tablet 3    acetaminophen (TYLENOL) 650 MG extended release tablet Take 650 mg by mouth every 8 hours as needed      amLODIPine (NORVASC) 5 MG tablet Take 5 mg by mouth daily      apixaban (ELIQUIS) 5 MG TABS tablet TAKE 1 TABLET TWICE DAILY      ascorbic acid (VITAMIN C) 1000 MG tablet Take by mouth      aspirin 81 MG EC tablet Take by mouth daily      vitamin D (CHOLECALCIFEROL) 25 MCG (1000 UT) TABS tablet Take 1,000 Units by mouth daily      fluticasone (FLONASE) 50 MCG/ACT nasal spray USE 2 SPRAYS IN EACH NOSTRIL EVERY DAY      lisinopril (PRINIVIL;ZESTRIL) 40 MG tablet Take 40 mg by mouth daily      loratadine (CLARITIN) 10 MG capsule Take 1 tablet by mouth daily metoprolol tartrate (LOPRESSOR) 50 MG tablet TAKE 1 TABLET TWICE DAILY      finasteride (PROSCAR) 5 MG tablet Take 5 mg by mouth daily       No current facility-administered medications for this visit. No Known Allergies  Patient Active Problem List    Diagnosis Date Noted    TIA (transient ischemic attack) 2021    Mass of arm, right 11/10/2020    Arthritis of knee 10/26/2020    Paroxysmal atrial fibrillation ([facility] 08/10/2020    Pancreatic cyst 2019     Needs CT q2 years x 2 years        Difficulty with CPAP full face mask use 2019    [place] (hard of hearing) 2019    DEMI (obstructive sleep apnea) 2019    NSVT (nonsustained ventricular tachycardia) 10/30/2018    Bradycardia 10/05/2018    Essential hypertension 04/15/2016    Thoracic aortic aneurysm without rupture 04/15/2016    Carotid artery disease ([facility] 04/15/2016    Hyperlipidemia 2013    Asbestos exposure 2013    Benign prostatic hyperplasia without lower urinary tract symptoms 2013    Abnormal chest x-ray 2013     Lung shadow          Past Surgical History:   Procedure Laterality Date    ORTHOPEDIC SURGERY      brandy in femur screw in hip    ORTHOPEDIC SURGERY      wrist fx    OTHER SURGICAL HISTORY      Bilateral wrist surgery    TONSILLECTOMY       Family History   Problem Relation Age of Onset    Stroke Father     Cancer Brother     Heart Attack Son 40         secondary to MI    Hypertension Mother     Hypertension Father     Diabetes Father      Social History     Tobacco Use    Smoking status: Never    Smokeless tobacco: Never   Substance Use Topics    Alcohol use: Not Currently         ROS:    No obvious pertinent positives on review of systems except for what was outlined in the HPI today.       PHYSICAL EXAM:     BP (!) 150/82   Pulse 68   Ht 5' 6\" (1.676 m)   Wt 210 lb (95.3 kg)   BMI 33.89 kg/m²    General/Constitutional:   Alert and oriented x 3, no acute distress  HEENT: normocephalic, atraumatic, moist mucous membranes  Neck:   No JVD or carotid bruits bilaterally  Cardiovascular:   regular rate and rhythm, no murmur/rub/gallop appreciated  Pulmonary:   clear to auscultation bilaterally, no respiratory distress  Abdomen:   soft, non-tender, non-distended  Ext:   mild LE edema bilaterally  Skin:  warm and dry, no obvious rashes seen  Neuro:   no obvious sensory or motor deficits  Psychiatric:   normal mood and affect      Lab Results   Component Value Date/Time     11/03/2021 04:20 PM    K 4.3 11/03/2021 04:20 PM     11/03/2021 04:20 PM    CO2 27 11/03/2021 04:20 PM    BUN 18 11/03/2021 04:20 PM    CREATININE 1.22 11/03/2021 04:20 PM    GLUCOSE 95 11/03/2021 04:20 PM    CALCIUM 9.7 11/03/2021 04:20 PM        Lab Results   Component Value Date    WBC 6.2 01/08/2021    HGB 14.1 01/08/2021    HCT 42.1 01/08/2021    MCV 96.3 01/08/2021     (L) 01/08/2021       Lab Results   Component Value Date    TSH 1.910 01/08/2021       Lab Results   Component Value Date    LABA1C 5.5 01/08/2021     Lab Results   Component Value Date     01/08/2021       Lab Results   Component Value Date    CHOL 119 01/09/2021    CHOL 132 01/08/2021    CHOL 104 10/26/2020     Lab Results   Component Value Date    TRIG 35 01/09/2021    TRIG 48 01/08/2021    TRIG 33 10/26/2020     Lab Results   Component Value Date    HDL 57 01/09/2021    HDL 65 (H) 01/08/2021    HDL 63 10/26/2020     Lab Results   Component Value Date    LDLCALC 55 01/09/2021    LDLCALC 57.4 01/08/2021    LDLCALC 31 10/26/2020     Lab Results   Component Value Date    LABVLDL 7 01/09/2021    LABVLDL 9.6 01/08/2021    LABVLDL 10 10/22/2019    VLDL 10 10/26/2020     Lab Results   Component Value Date    CHOLHDLRATIO 2.1 01/09/2021    CHOLHDLRATIO 2.0 01/08/2021           I have Independently reviewed prior care notes, any ER records available, cardiac testing, labs and results with the patient and before seeing the patient today. Also independently reviewed outside records when available. ASSESSMENT:    Lolis Mcduffie was seen today for valvular heart disease. Diagnoses and all orders for this visit:    NSVT (nonsustained ventricular tachycardia)    Paroxysmal atrial fibrillation (HCC)    Aneurysm of ascending aorta without rupture    Essential hypertension    Bilateral carotid artery stenosis    DEMI (obstructive sleep apnea)        PLAN:      1. HTN:  Better on Ace and norvasc. SBP ok now, tolerate mild HTN. Better now. On CPAP         2. Afib:   HR better. Plan for rate control for now. Better from TIA. On NOAC now. Labs in Nov.  [place]. I have reviewed their anticoagulation treatment, instructed patient to call with any bleeding issues such as melana or other. The patient will call with any issues related to their treatment. All questions were answered with the patient voicing understanding. 3. Thor Ao aneurysm:    On BB now. No more echoes needed. BP ok, HR ok.        4. Asbestosis:  Followed by Lukasz Hernandez. 5. DEMI: on CPAP. Labs Nov.          Patient has been instructed and agrees to call our office with any issues or other concerns related to their cardiac condition(s) and/or complaint(s). Return in about 6 months (around 4/4/2023).        YULISSA SAVAGE, DO  10/4/2022

## 2022-10-24 ENCOUNTER — OFFICE VISIT (OUTPATIENT)
Dept: UROLOGY | Age: 87
End: 2022-10-24
Payer: MEDICARE

## 2022-10-24 DIAGNOSIS — N13.8 BPH WITH OBSTRUCTION/LOWER URINARY TRACT SYMPTOMS: Primary | ICD-10-CM

## 2022-10-24 DIAGNOSIS — N40.1 BPH WITH OBSTRUCTION/LOWER URINARY TRACT SYMPTOMS: Primary | ICD-10-CM

## 2022-10-24 DIAGNOSIS — R31.0 GROSS HEMATURIA: ICD-10-CM

## 2022-10-24 LAB
BILIRUBIN, URINE, POC: NEGATIVE
BLOOD URINE, POC: NEGATIVE
GLUCOSE URINE, POC: NEGATIVE
KETONES, URINE, POC: NEGATIVE
LEUKOCYTE ESTERASE, URINE, POC: NEGATIVE
NITRITE, URINE, POC: NEGATIVE
PH, URINE, POC: 6 (ref 4.6–8)
PROTEIN,URINE, POC: 30
SPECIFIC GRAVITY, URINE, POC: 1.03 (ref 1–1.03)
URINALYSIS CLARITY, POC: NORMAL
URINALYSIS COLOR, POC: NORMAL
UROBILINOGEN, POC: NORMAL

## 2022-10-24 PROCEDURE — G8417 CALC BMI ABV UP PARAM F/U: HCPCS | Performed by: NURSE PRACTITIONER

## 2022-10-24 PROCEDURE — G8484 FLU IMMUNIZE NO ADMIN: HCPCS | Performed by: NURSE PRACTITIONER

## 2022-10-24 PROCEDURE — G8427 DOCREV CUR MEDS BY ELIG CLIN: HCPCS | Performed by: NURSE PRACTITIONER

## 2022-10-24 PROCEDURE — 1123F ACP DISCUSS/DSCN MKR DOCD: CPT | Performed by: NURSE PRACTITIONER

## 2022-10-24 PROCEDURE — 99214 OFFICE O/P EST MOD 30 MIN: CPT | Performed by: NURSE PRACTITIONER

## 2022-10-24 PROCEDURE — 81003 URINALYSIS AUTO W/O SCOPE: CPT | Performed by: NURSE PRACTITIONER

## 2022-10-24 PROCEDURE — 1036F TOBACCO NON-USER: CPT | Performed by: NURSE PRACTITIONER

## 2022-10-24 RX ORDER — FINASTERIDE 5 MG/1
5 TABLET, FILM COATED ORAL DAILY
Qty: 90 TABLET | Refills: 3 | Status: SHIPPED | OUTPATIENT
Start: 2022-10-24 | End: 2023-01-22

## 2022-10-24 ASSESSMENT — ENCOUNTER SYMPTOMS
BACK PAIN: 0
ABDOMINAL PAIN: 0

## 2022-10-24 NOTE — PROGRESS NOTES
[address] [place] [address] Court, [address] [facility]  [phone]    Patient Name:  Shweta Fuller  YOB: 1931      Office Visit 10/25/2022    CHIEF COMPLAINT:    Chief Complaint   Patient presents with    Follow-up     DEMI ON CPAP THERAPY         HISTORY OF PRESENT ILLNESS:  Patient is seen today for follow up of DEMI. He is accompanied by his daughter who assists with history. He was diagnosed with sleep apnea in 2019. Sleep study was 1/8/2019 with AHI 27.1/hr with lowest oxygen saturation 80%. He is prescribed APAP 10-16 cm. Pressure was to be changed at last visit to APAP 10-14 cm but this was not done. He demonstrates excellent compliance on therapy. He is now changing his masks more often and his mask leak has improved. AHI is 2.8/hr. he denies any significant medical changes since last visit. He is happy with his mask that he is using. It is a full face mask. He is sleeping well using the machine.          Past Medical History:   Diagnosis Date    Abnormal chest x-ray 5/6/2013    Lung shadow    Asbestos exposure     hx of as     Asbestos exposure 5/6/2013    Benign prostatic hypertrophy 5/6/2013    Hyperlipidemia 5/6/2013    Sleep apnea     Stroke [facility])     Unspecified essential hypertension 5/6/2013         Patient Active Problem List   Diagnosis    Difficulty with CPAP full face mask use    Bradycardia    NSVT (nonsustained ventricular tachycardia)    [place] (hard of hearing)    Hyperlipidemia    Essential hypertension    Pancreatic cyst    Thoracic aortic aneurysm without rupture    Carotid artery disease (HCC)    DEMI (obstructive sleep apnea)    Mass of arm, right    Paroxysmal atrial fibrillation (HCC)    Asbestos exposure    TIA (transient ischemic attack)    Arthritis of knee    Benign prostatic hyperplasia without lower urinary tract symptoms    Abnormal chest x-ray          Past Surgical History:   Procedure Laterality Date    ORTHOPEDIC SURGERY      brandy in femur screw in hip    ORTHOPEDIC SURGERY      wrist fx    OTHER SURGICAL HISTORY      Bilateral wrist surgery    TONSILLECTOMY             Social History     Socioeconomic History    Marital status:       Spouse name: Not on file    Number of children: Not on file    Years of education: Not on file    Highest education level: Not on file   Occupational History    Not on file   Tobacco Use    Smoking status: Never    Smokeless tobacco: Never   Substance and Sexual Activity    Alcohol use: Not Currently    Drug use: Never    Sexual activity: Not on file   Other Topics Concern    Not on file   Social History Narrative    Not on file     Social Determinants of Health     Financial Resource Strain: Not on file   Food Insecurity: Not on file   Transportation Needs: Not on file   Physical Activity: Not on file   Stress: Not on file   Social Connections: Not on file   Intimate Partner Violence: Not on file   Housing Stability: Not on file         Family History   Problem Relation Age of Onset    Stroke Father     Cancer Brother     Heart Attack Son 40         secondary to MI    Hypertension Mother     Hypertension Father     Diabetes Father          No Known Allergies      Current Outpatient Medications   Medication Sig    finasteride (PROSCAR) 5 MG tablet Take 1 tablet by mouth daily    atorvastatin (LIPITOR) 80 MG tablet TAKE 1 TABLET EVERY DAY    amLODIPine (NORVASC) 5 MG tablet Take 1 tablet by mouth daily    apixaban (ELIQUIS) 5 MG TABS tablet Take 1 tablet by mouth 2 times daily TAKE 1 TABLET TWICE DAILY    lisinopril (PRINIVIL;ZESTRIL) 40 MG tablet Take 1 tablet by mouth daily    metoprolol tartrate (LOPRESSOR) 50 MG tablet Take 1 tablet by mouth 2 times daily TAKE 1 TABLET TWICE DAILY    acetaminophen (TYLENOL) 650 MG extended release tablet Take 650 mg by mouth every 8 hours as needed    ascorbic acid (VITAMIN C) 1000 MG tablet Take by mouth    aspirin 81 MG EC tablet Take by mouth Decision Making)      Diagnosis Orders   1. DEMI (obstructive sleep apnea)  DME - DURABLE MEDICAL EQUIPMENT   Patient is using and benefiting from pap therapy. Change pressure to APAP 10-14 cm  Renew supplies  Follow up in 6 months or sooner if needed             Orders Placed This Encounter   Procedures    [facility] [address] [facility]  Phone: [phone] [employer] [address]  [address] [place] [address] Way [address]  Dept: [phone]      Patient Name: Alex Steward  : 1931  Gender: male  Address: [address] [place] [id number]  Patient phone: [phone] (home)       Primary Insurance: Payor: [employer] / Plan: [employer] / Product Type: *No Product type* /   Subscriber ID: [id number] - (Medicare Managed)      AMB Supply Order  Order Details     DME Location:   Order Date: 10/25/2022   The encounter diagnosis was DEMI (obstructive sleep apnea).              (  X   )Supplies Needed       apap Machine   (     ) CPAP Unit  (     ) Auto CPAP Unit  (     ) BiLevel Unit  (     ) Auto BiLevel Unit  (     ) ASV   (     ) Bilevel ST      Length of need: 12 months    Pressure:  10-14 cmH20  EPR:      Starting Ramp Pressure:   cm H20  Ramp Time: min      Patient had a diagnostic Apnea Hypopnea Index (AHI) of :    *SUPPLIES* Replace all as needed, or per coverage guidelines     Masks Type:  ( x   ) -Full Face Mask (1 per 3 mon)  (  x  ) -Full Mask (1 per month) Interface/Cushion      (  ) -Nasal Mask (1 per 3 mon)  (  ) - Nasal Mask (1 per month) Interface/Cushion  (     ) -Pillow (2 per mon)  (     ) -Ssgejutot (1 per 6 mon)            Other Supplies:    (   X  )-Kajdidhm (1 per 6 mon)  (   X  )-Wpqttj Tubing (1 per 3 mon)  (   X  )- Disposable Filter (2 per mon)  (   x  )-Qvpvxi Humidifier (1 per year)     ( x    )-Kfrqmfspp (sometimes used with Full Face Mask) (1 per 6 mos)  (    )-Tubing-without heat (1 per 3 mos)  (     )-Non-Disposable Filter (1 per 6 mos)  (  x   )-Water Chamber (1 per 6 mos)  (     )-Humidifier non-heated (1 per 5 yrs)      Signed Date: 10/25/2022  Electronically Signed By: JESSI Lipscomb CNP  Electronically Dated:  10/25/2022       No orders of the defined types were placed in this encounter. Collaborating Physician: Dr. Marjorie Manrique     Over 50% of today's office visit was spent in face to face time reviewing test results, prognosis, importance of compliance, education about disease process, benefits of medications, instructions for management of acute flare-ups, and follow up plans. Total face to face time spent with patient was 25 minutes.         JESSI Lipscomb CNP  Electronically signed

## 2022-10-24 NOTE — PROGRESS NOTES
[facility]  [address] [facility] [address], [address]  [phone]          Tamara Brooks  : 1931    Chief Complaint   Patient presents with    Other     Urinary Incontinence          HPI     Tamara Brooks is a 80 y.o. male followed for BPH and gross hematuria. CT scan done at an outside hospital showed no upper tract abnormality in 2018. BPH was noted and there was a filling defect within the bladder that could represent clot or a polypoid tumor. the follow up cystoscopy showed BPH with prostatic bleeding. Has been on finasteride 5 mg PO daily. No recurrence of hematuria. No issues today. Had UTI treated by urgent care earlier this year.       Past Medical History:   Diagnosis Date    Abnormal chest x-ray 2013    Lung shadow    Asbestos exposure     hx of as     Asbestos exposure 2013    Benign prostatic hypertrophy 2013    Hyperlipidemia 2013    Sleep apnea     Stroke [facility])     Unspecified essential hypertension 2013     Past Surgical History:   Procedure Laterality Date    ORTHOPEDIC SURGERY      brandy in femur screw in hip    ORTHOPEDIC SURGERY      wrist fx    OTHER SURGICAL HISTORY      Bilateral wrist surgery    TONSILLECTOMY       Current Outpatient Medications   Medication Sig Dispense Refill    atorvastatin (LIPITOR) 80 MG tablet TAKE 1 TABLET EVERY DAY 90 tablet 3    amLODIPine (NORVASC) 5 MG tablet Take 1 tablet by mouth daily 90 tablet 3    apixaban (ELIQUIS) 5 MG TABS tablet Take 1 tablet by mouth 2 times daily TAKE 1 TABLET TWICE DAILY 180 tablet 3    finasteride (PROSCAR) 5 MG tablet Take 1 tablet by mouth daily 90 tablet 3    lisinopril (PRINIVIL;ZESTRIL) 40 MG tablet Take 1 tablet by mouth daily 90 tablet 3    metoprolol tartrate (LOPRESSOR) 50 MG tablet Take 1 tablet by mouth 2 times daily TAKE 1 TABLET TWICE DAILY 180 tablet 3    acetaminophen (TYLENOL) 650 MG extended release tablet Take 650 mg by mouth every 8 hours as needed      ascorbic acid (VITAMIN C) 1000 MG tablet Take by mouth      aspirin 81 MG EC tablet Take by mouth daily      vitamin D (CHOLECALCIFEROL) 25 MCG (1000 UT) TABS tablet Take 1,000 Units by mouth daily      fluticasone (FLONASE) 50 MCG/ACT nasal spray USE 2 SPRAYS IN EACH NOSTRIL EVERY DAY      loratadine (CLARITIN) 10 MG capsule Take 1 tablet by mouth daily       No current facility-administered medications for this visit. No Known Allergies  Social History     Socioeconomic History    Marital status:      Spouse name: Not on file    Number of children: Not on file    Years of education: Not on file    Highest education level: Not on file   Occupational History    Not on file   Tobacco Use    Smoking status: Never    Smokeless tobacco: Never   Substance and Sexual Activity    Alcohol use: Not Currently    Drug use: Never    Sexual activity: Not on file   Other Topics Concern    Not on file   Social History Narrative    Not on file     Social Determinants of Health     Financial Resource Strain: Not on file   Food Insecurity: Not on file   Transportation Needs: Not on file   Physical Activity: Not on file   Stress: Not on file   Social Connections: Not on file   Intimate Partner Violence: Not on file   Housing Stability: Not on file     Family History   Problem Relation Age of Onset    Stroke Father     Cancer Brother     Heart Attack Son 40         secondary to MI    Hypertension Mother     Hypertension Father     Diabetes Father        Review of Systems  Constitutional:   Negative for fever. GI:  Negative for abdominal pain. Musculoskeletal:  Negative for back pain.     Urinalysis  UA - Dipstick  Results for orders placed or performed in visit on 10/24/22   AMB POC URINALYSIS DIP STICK AUTO W/O MICRO   Result Value Ref Range    Color (UA POC)      Clarity (UA POC)      Glucose, Urine, POC Negative Negative    Bilirubin, Urine, POC Negative Negative    KETONES, Urine, POC Negative Negative    Specific Gravity, Urine, POC 1.030 1.001 - 1.035    Blood (UA POC) Negative Negative    pH, Urine, POC 6.0 4.6 - 8.0    Protein, Urine, POC 30 Negative    Urobilinogen, POC 0.2 mg/dL     Nitrite, Urine, POC Negative Negative    Leukocyte Esterase, Urine, POC Negative Negative       UA - Micro  WBC - 0  RBC - 0  Bacteria - 0  Epith - 0      PHYSICAL EXAM    General appearance - well appearing and in no distress  Mental status - alert, oriented to person, place, and time  Neck - supple, no significant adenopathy  Chest/Lung-  Quiet, even and easy respiratory effort without use of accessory muscles  Skin - normal coloration and turgor, no rashes      Assessment and Plan    ICD-10-CM    1. BPH with obstruction/lower urinary tract symptoms  N40.1 AMB POC URINALYSIS DIP STICK AUTO W/O MICRO    N13.8 CANCELED: AMB POC PVR, AXEL,POST-VOID RES,US,NON-IMAGING      2. Gross hematuria  R31.0           BPH/LUTS- urine normal. Cont finasteride 5 mg PO daily. Gross hematuria- no recurrence. Cont to follow. RTO in 1 year for follow up. Advised to call sooner if symptoms worsen. Advised to call this office if develops sx of UTI for specimen check. If unable to come to this office, encouraged to have UC performed and records sent to this office. Marlon Quesada, LENIP, APRN - CNP  Dr. Delon Hess is supervising physician today and he approves plan of care.

## 2022-10-25 ENCOUNTER — OFFICE VISIT (OUTPATIENT)
Dept: SLEEP MEDICINE | Age: 87
End: 2022-10-25
Payer: MEDICARE

## 2022-10-25 VITALS
RESPIRATION RATE: 16 BRPM | WEIGHT: 212.1 LBS | BODY MASS INDEX: 34.09 KG/M2 | OXYGEN SATURATION: 98 % | HEART RATE: 64 BPM | HEIGHT: 66 IN | DIASTOLIC BLOOD PRESSURE: 76 MMHG | SYSTOLIC BLOOD PRESSURE: 118 MMHG | TEMPERATURE: 96.9 F

## 2022-10-25 DIAGNOSIS — G47.33 OSA (OBSTRUCTIVE SLEEP APNEA): Primary | ICD-10-CM

## 2022-10-25 PROCEDURE — 1036F TOBACCO NON-USER: CPT | Performed by: NURSE PRACTITIONER

## 2022-10-25 PROCEDURE — 1123F ACP DISCUSS/DSCN MKR DOCD: CPT | Performed by: NURSE PRACTITIONER

## 2022-10-25 PROCEDURE — 99213 OFFICE O/P EST LOW 20 MIN: CPT | Performed by: NURSE PRACTITIONER

## 2022-10-25 PROCEDURE — G8484 FLU IMMUNIZE NO ADMIN: HCPCS | Performed by: NURSE PRACTITIONER

## 2022-10-25 PROCEDURE — G8417 CALC BMI ABV UP PARAM F/U: HCPCS | Performed by: NURSE PRACTITIONER

## 2022-10-25 PROCEDURE — G8427 DOCREV CUR MEDS BY ELIG CLIN: HCPCS | Performed by: NURSE PRACTITIONER

## 2022-10-25 ASSESSMENT — SLEEP AND FATIGUE QUESTIONNAIRES
HOW LIKELY ARE YOU TO NOD OFF OR FALL ASLEEP WHILE SITTING AND READING: 0
HOW LIKELY ARE YOU TO NOD OFF OR FALL ASLEEP WHILE SITTING AND TALKING TO SOMEONE: 1
HOW LIKELY ARE YOU TO NOD OFF OR FALL ASLEEP WHILE LYING DOWN TO REST IN THE AFTERNOON WHEN CIRCUMSTANCES PERMIT: 0
HOW LIKELY ARE YOU TO NOD OFF OR FALL ASLEEP WHILE SITTING QUIETLY AFTER LUNCH WITHOUT ALCOHOL: 1
HOW LIKELY ARE YOU TO NOD OFF OR FALL ASLEEP WHEN YOU ARE A PASSENGER IN A CAR FOR AN HOUR WITHOUT A BREAK: 0
HOW LIKELY ARE YOU TO NOD OFF OR FALL ASLEEP IN A CAR, WHILE STOPPED FOR A FEW MINUTES IN TRAFFIC: 0
ESS TOTAL SCORE: 2
HOW LIKELY ARE YOU TO NOD OFF OR FALL ASLEEP WHILE WATCHING TV: 0
HOW LIKELY ARE YOU TO NOD OFF OR FALL ASLEEP WHILE SITTING INACTIVE IN A PUBLIC PLACE: 0

## 2022-11-22 ENCOUNTER — TELEPHONE (OUTPATIENT)
Dept: FAMILY MEDICINE CLINIC | Facility: CLINIC | Age: 87
End: 2022-11-22

## 2022-11-22 DIAGNOSIS — I10 ESSENTIAL HYPERTENSION: Primary | ICD-10-CM

## 2022-11-25 ENCOUNTER — NURSE ONLY (OUTPATIENT)
Dept: FAMILY MEDICINE CLINIC | Facility: CLINIC | Age: 87
End: 2022-11-25

## 2022-11-25 DIAGNOSIS — I10 ESSENTIAL HYPERTENSION: ICD-10-CM

## 2022-11-25 LAB
ALBUMIN SERPL-MCNC: 3.7 G/DL (ref 3.2–4.6)
ALBUMIN/GLOB SERPL: 1.5 {RATIO} (ref 0.4–1.6)
ALP SERPL-CCNC: 79 U/L (ref 50–136)
ALT SERPL-CCNC: 67 U/L (ref 12–65)
ANION GAP SERPL CALC-SCNC: 5 MMOL/L (ref 2–11)
AST SERPL-CCNC: 46 U/L (ref 15–37)
BILIRUB SERPL-MCNC: 1.5 MG/DL (ref 0.2–1.1)
BUN SERPL-MCNC: 24 MG/DL (ref 8–23)
CALCIUM SERPL-MCNC: 9.2 MG/DL (ref 8.3–10.4)
CHLORIDE SERPL-SCNC: 106 MMOL/L (ref 101–110)
CHOLEST SERPL-MCNC: 78 MG/DL
CO2 SERPL-SCNC: 30 MMOL/L (ref 21–32)
CREAT SERPL-MCNC: 1.3 MG/DL (ref 0.8–1.5)
GLOBULIN SER CALC-MCNC: 2.4 G/DL (ref 2.8–4.5)
GLUCOSE SERPL-MCNC: 95 MG/DL (ref 65–100)
HDLC SERPL-MCNC: 61 MG/DL (ref 40–60)
HDLC SERPL: 1.3 {RATIO}
LDLC SERPL CALC-MCNC: 11.8 MG/DL
POTASSIUM SERPL-SCNC: 4 MMOL/L (ref 3.5–5.1)
PROT SERPL-MCNC: 6.1 G/DL (ref 6.3–8.2)
SODIUM SERPL-SCNC: 141 MMOL/L (ref 133–143)
TRIGL SERPL-MCNC: 26 MG/DL (ref 35–150)
VLDLC SERPL CALC-MCNC: 5.2 MG/DL (ref 6–23)

## 2022-11-30 SDOH — HEALTH STABILITY: PHYSICAL HEALTH: ON AVERAGE, HOW MANY MINUTES DO YOU ENGAGE IN EXERCISE AT THIS LEVEL?: 10 MIN

## 2022-11-30 SDOH — HEALTH STABILITY: PHYSICAL HEALTH: ON AVERAGE, HOW MANY DAYS PER WEEK DO YOU ENGAGE IN MODERATE TO STRENUOUS EXERCISE (LIKE A BRISK WALK)?: 1 DAY

## 2022-11-30 ASSESSMENT — LIFESTYLE VARIABLES
HOW OFTEN DURING THE LAST YEAR HAVE YOU FAILED TO DO WHAT WAS NORMALLY EXPECTED FROM YOU BECAUSE OF DRINKING: NEVER
HOW OFTEN DURING THE LAST YEAR HAVE YOU FAILED TO DO WHAT WAS NORMALLY EXPECTED FROM YOU BECAUSE OF DRINKING: 0
HAS A RELATIVE, FRIEND, DOCTOR, OR ANOTHER HEALTH PROFESSIONAL EXPRESSED CONCERN ABOUT YOUR DRINKING OR SUGGESTED YOU CUT DOWN: 0
HOW OFTEN DURING THE LAST YEAR HAVE YOU HAD A FEELING OF GUILT OR REMORSE AFTER DRINKING: NEVER
HOW OFTEN DURING THE LAST YEAR HAVE YOU FOUND THAT YOU WERE NOT ABLE TO STOP DRINKING ONCE YOU HAD STARTED: NEVER
HOW MANY STANDARD DRINKS CONTAINING ALCOHOL DO YOU HAVE ON A TYPICAL DAY: 1
HOW OFTEN DO YOU HAVE SIX OR MORE DRINKS ON ONE OCCASION: 1
HOW OFTEN DO YOU HAVE A DRINK CONTAINING ALCOHOL: 4 OR MORE TIMES A WEEK
HOW OFTEN DURING THE LAST YEAR HAVE YOU BEEN UNABLE TO REMEMBER WHAT HAPPENED THE NIGHT BEFORE BECAUSE YOU HAD BEEN DRINKING: 0
HOW OFTEN DURING THE LAST YEAR HAVE YOU NEEDED AN ALCOHOLIC DRINK FIRST THING IN THE MORNING TO GET YOURSELF GOING AFTER A NIGHT OF HEAVY DRINKING: NEVER
HOW MANY STANDARD DRINKS CONTAINING ALCOHOL DO YOU HAVE ON A TYPICAL DAY: 1 OR 2
HOW OFTEN DURING THE LAST YEAR HAVE YOU NEEDED AN ALCOHOLIC DRINK FIRST THING IN THE MORNING TO GET YOURSELF GOING AFTER A NIGHT OF HEAVY DRINKING: 0
HAS A RELATIVE, FRIEND, DOCTOR, OR ANOTHER HEALTH PROFESSIONAL EXPRESSED CONCERN ABOUT YOUR DRINKING OR SUGGESTED YOU CUT DOWN: NO
HOW OFTEN DO YOU HAVE A DRINK CONTAINING ALCOHOL: 5
HOW OFTEN DURING THE LAST YEAR HAVE YOU FOUND THAT YOU WERE NOT ABLE TO STOP DRINKING ONCE YOU HAD STARTED: 0
HOW OFTEN DURING THE LAST YEAR HAVE YOU HAD A FEELING OF GUILT OR REMORSE AFTER DRINKING: 0
HOW OFTEN DURING THE LAST YEAR HAVE YOU BEEN UNABLE TO REMEMBER WHAT HAPPENED THE NIGHT BEFORE BECAUSE YOU HAD BEEN DRINKING: NEVER
HAVE YOU OR SOMEONE ELSE BEEN INJURED AS A RESULT OF YOUR DRINKING: 0
HAVE YOU OR SOMEONE ELSE BEEN INJURED AS A RESULT OF YOUR DRINKING: NO

## 2022-11-30 ASSESSMENT — PATIENT HEALTH QUESTIONNAIRE - PHQ9
4. FEELING TIRED OR HAVING LITTLE ENERGY: 1
9. THOUGHTS THAT YOU WOULD BE BETTER OFF DEAD, OR OF HURTING YOURSELF: 0
SUM OF ALL RESPONSES TO PHQ QUESTIONS 1-9: 9
3. TROUBLE FALLING OR STAYING ASLEEP: 1
1. LITTLE INTEREST OR PLEASURE IN DOING THINGS: 3
SUM OF ALL RESPONSES TO PHQ QUESTIONS 1-9: 9
SUM OF ALL RESPONSES TO PHQ QUESTIONS 1-9: 9
5. POOR APPETITE OR OVEREATING: 0
6. FEELING BAD ABOUT YOURSELF - OR THAT YOU ARE A FAILURE OR HAVE LET YOURSELF OR YOUR FAMILY DOWN: 1
7. TROUBLE CONCENTRATING ON THINGS, SUCH AS READING THE NEWSPAPER OR WATCHING TELEVISION: 0
10. IF YOU CHECKED OFF ANY PROBLEMS, HOW DIFFICULT HAVE THESE PROBLEMS MADE IT FOR YOU TO DO YOUR WORK, TAKE CARE OF THINGS AT HOME, OR GET ALONG WITH OTHER PEOPLE: 1
2. FEELING DOWN, DEPRESSED OR HOPELESS: 3
SUM OF ALL RESPONSES TO PHQ QUESTIONS 1-9: 9
SUM OF ALL RESPONSES TO PHQ9 QUESTIONS 1 & 2: 6
8. MOVING OR SPEAKING SO SLOWLY THAT OTHER PEOPLE COULD HAVE NOTICED. OR THE OPPOSITE, BEING SO FIGETY OR RESTLESS THAT YOU HAVE BEEN MOVING AROUND A LOT MORE THAN USUAL: 0

## 2022-12-01 ENCOUNTER — OFFICE VISIT (OUTPATIENT)
Dept: FAMILY MEDICINE CLINIC | Facility: CLINIC | Age: 87
End: 2022-12-01
Payer: MEDICARE

## 2022-12-01 ENCOUNTER — TELEPHONE (OUTPATIENT)
Dept: CARDIOLOGY CLINIC | Age: 87
End: 2022-12-01

## 2022-12-01 ENCOUNTER — NURSE ONLY (OUTPATIENT)
Dept: FAMILY MEDICINE CLINIC | Facility: CLINIC | Age: 87
End: 2022-12-01

## 2022-12-01 VITALS
HEART RATE: 69 BPM | WEIGHT: 209 LBS | BODY MASS INDEX: 33.59 KG/M2 | HEIGHT: 66 IN | OXYGEN SATURATION: 95 % | RESPIRATION RATE: 16 BRPM | SYSTOLIC BLOOD PRESSURE: 160 MMHG | DIASTOLIC BLOOD PRESSURE: 98 MMHG | TEMPERATURE: 97.8 F

## 2022-12-01 DIAGNOSIS — E78.2 MIXED HYPERLIPIDEMIA: ICD-10-CM

## 2022-12-01 DIAGNOSIS — R79.89 ELEVATED LFTS: ICD-10-CM

## 2022-12-01 DIAGNOSIS — M17.10 ARTHRITIS OF KNEE: ICD-10-CM

## 2022-12-01 DIAGNOSIS — I71.21 ANEURYSM OF ASCENDING AORTA WITHOUT RUPTURE: ICD-10-CM

## 2022-12-01 DIAGNOSIS — I65.23 BILATERAL CAROTID ARTERY STENOSIS: ICD-10-CM

## 2022-12-01 DIAGNOSIS — I48.0 PAROXYSMAL ATRIAL FIBRILLATION (HCC): ICD-10-CM

## 2022-12-01 DIAGNOSIS — F32.0 CURRENT MILD EPISODE OF MAJOR DEPRESSIVE DISORDER WITHOUT PRIOR EPISODE (HCC): ICD-10-CM

## 2022-12-01 DIAGNOSIS — Z00.00 MEDICARE ANNUAL WELLNESS VISIT, SUBSEQUENT: Primary | ICD-10-CM

## 2022-12-01 DIAGNOSIS — I47.29 NSVT (NONSUSTAINED VENTRICULAR TACHYCARDIA): ICD-10-CM

## 2022-12-01 DIAGNOSIS — G47.33 OSA (OBSTRUCTIVE SLEEP APNEA): ICD-10-CM

## 2022-12-01 DIAGNOSIS — I10 ESSENTIAL HYPERTENSION: ICD-10-CM

## 2022-12-01 DIAGNOSIS — H91.90 HEARING LOSS, UNSPECIFIED HEARING LOSS TYPE, UNSPECIFIED LATERALITY: ICD-10-CM

## 2022-12-01 PROBLEM — K86.2 PANCREATIC CYST: Status: RESOLVED | Noted: 2019-04-26 | Resolved: 2022-12-01

## 2022-12-01 PROBLEM — R22.31 MASS OF ARM, RIGHT: Status: RESOLVED | Noted: 2020-11-10 | Resolved: 2022-12-01

## 2022-12-01 PROBLEM — R00.1 BRADYCARDIA: Status: RESOLVED | Noted: 2018-10-05 | Resolved: 2022-12-01

## 2022-12-01 PROCEDURE — G8417 CALC BMI ABV UP PARAM F/U: HCPCS | Performed by: FAMILY MEDICINE

## 2022-12-01 PROCEDURE — G0439 PPPS, SUBSEQ VISIT: HCPCS | Performed by: FAMILY MEDICINE

## 2022-12-01 PROCEDURE — 1036F TOBACCO NON-USER: CPT | Performed by: FAMILY MEDICINE

## 2022-12-01 PROCEDURE — G8427 DOCREV CUR MEDS BY ELIG CLIN: HCPCS | Performed by: FAMILY MEDICINE

## 2022-12-01 PROCEDURE — 99214 OFFICE O/P EST MOD 30 MIN: CPT | Performed by: FAMILY MEDICINE

## 2022-12-01 PROCEDURE — 1123F ACP DISCUSS/DSCN MKR DOCD: CPT | Performed by: FAMILY MEDICINE

## 2022-12-01 PROCEDURE — G8484 FLU IMMUNIZE NO ADMIN: HCPCS | Performed by: FAMILY MEDICINE

## 2022-12-01 RX ORDER — ESCITALOPRAM OXALATE 5 MG/1
5 TABLET ORAL DAILY
Qty: 90 TABLET | Refills: 1 | Status: SHIPPED | OUTPATIENT
Start: 2022-12-01

## 2022-12-01 NOTE — TELEPHONE ENCOUNTER
Patient daughter called stated that patient had recent lab work done at PCP and stated the Cholesterol, Dr. Medina Gaines needed to look at. Please review and follow up with patient.  Thanks

## 2022-12-01 NOTE — PATIENT INSTRUCTIONS
Personalized Preventive Plan for Allison Rizo - 12/1/2022  Medicare offers a range of preventive health benefits. Some of the tests and screenings are paid in full while other may be subject to a deductible, co-insurance, and/or copay. Some of these benefits include a comprehensive review of your medical history including lifestyle, illnesses that may run in your family, and various assessments and screenings as appropriate. After reviewing your medical record and screening and assessments performed today your provider may have ordered immunizations, labs, imaging, and/or referrals for you. A list of these orders (if applicable) as well as your Preventive Care list are included within your After Visit Summary for your review. Other Preventive Recommendations:    A preventive eye exam performed by an eye specialist is recommended every 1-2 years to screen for glaucoma; cataracts, macular degeneration, and other eye disorders. A preventive dental visit is recommended every 6 months. Try to get at least 150 minutes of exercise per week or 10,000 steps per day on a pedometer . Order or download the FREE \"Exercise & Physical Activity: Your Everyday Guide\" from [employer]. Call 4-851.576.5396 or search [employer] online. You need 3230-3685 mg of calcium and 3388-1998 IU of vitamin D per day. It is possible to meet your calcium requirement with diet alone, but a vitamin D supplement is usually necessary to meet this goal.  When exposed to the sun, use a sunscreen that protects against both UVA and UVB radiation with an SPF of 30 or greater. Reapply every 2 to 3 hours or after sweating, drying off with a towel, or swimming. Always wear a seat belt when traveling in a car. Always wear a helmet when riding a bicycle or motorcycle.

## 2022-12-01 NOTE — PROGRESS NOTES
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  Recommended screening schedule for the next 5-10 years is provided to the patient in written form: see Patient Instructions/AVS.     Return in 4 weeks (on 12/29/2022) for Medicare Annual Wellness Visit in 1 year. Subjective   The following acute and/or chronic problems were also addressed today:  Blood pressure has been elevated. He has recently been put on amlodipine. He is not having side effects. He does see cardiology regularly. He does admit that he is down. His daughter is with him and says he seems really down and unmotivated to do things. No thoughts of suicide. PHQ-9  11/30/2022   Little interest or pleasure in doing things 3   Little interest or pleasure in doing things -   Feeling down, depressed, or hopeless 3   Trouble falling or staying asleep, or sleeping too much 1   Feeling tired or having little energy 1   Poor appetite or overeating 0   Feeling bad about yourself - or that you are a failure or have let yourself or your family down 1   Trouble concentrating on things, such as reading the newspaper or watching television 0   Moving or speaking so slowly that other people could have noticed.  Or the opposite - being so fidgety or restless that you have been moving around a lot more than usual 0   Thoughts that you would be better off dead, or of hurting yourself in some way 0   PHQ-2 Score 6   Total Score PHQ 2 -   PHQ-9 Total Score 9   If you checked off any problems, how difficult have these problems made it for you to do your work, take care of things at home, or get along with other people? 1         Lexapro given and gone over. He will follow-up with me in 1 month to see how he is doing. We did go over his blood work and his LDL is 11. I would like for him to cut his Lipitor in half. He will discuss with his cardiologist if this is okay. LFTs elevated so we will recheck. Patient's complete Health Risk Assessment and screening values have been reviewed and are found in Flowsheets. The following problems were reviewed today and where indicated follow up appointments were made and/or referrals ordered. Positive Risk Factor Screenings with Interventions:    Fall Risk:  Do you feel unsteady or are you worried about falling? : (!) yes  2 or more falls in past year?: no  Fall with injury in past year?: no   Fall Risk Interventions:    Home safety tips provided     Depression:  PHQ-2 Score: 6  PHQ-9 Total Score: 9    Severity:1-4 = minimal depression, 5-9 = mild depression, 10-14 = moderate depression, 15-19 = moderately severe depression, 20-27 = severe depression  Depression Interventions:    Medication prescribed today.           General Health and ACP:  General  In general, how would you say your health is?: Good  In the past 7 days, have you experienced any of the following: New or Increased Pain, New or Increased Fatigue, Loneliness, Social Isolation, Stress or Anger?: No  Do you get the social and emotional support that you need?: (!) No  Do you have a Living Will?: (!) No    Advance Directives       Power of  Living Will ACP-Advance Directive ACP-Power of     Not on File Not on File Not on File Not on File          General Health Risk Interventions:  No Living Will: Advance Care Planning addressed with patient today    Health Habits/Nutrition:  Physical Activity: Insufficiently Active    Days of Exercise per Week: 1 day    Minutes of Exercise per Session: 10 min     Have you lost any weight without trying in the past 3 months?: No  Body mass index: (!) 33.73  Have you seen the dentist within the past year?: Yes  Health Habits/Nutrition Interventions:  Inadequate physical activity:     He will try to use his walker when he walks. Try to walk more. He does have trouble with knee arthritis and so he will be given a handicap sticker today. Hearing/Vision:  Do you or your family notice any trouble with your hearing that hasn't been managed with hearing aids?: No  Do you have difficulty driving, watching TV, or doing any of your daily activities because of your eyesight?: (!) Yes  Have you had an eye exam within the past year?: Appointment is scheduled  No results found. Hearing/Vision Interventions:  Hearing concerns: He wears hearing aids. ADLs:  In the past 7 days, did you need help from others to perform any of the following everyday activities: Eating, dressing, grooming, bathing, toileting, or walking/balance?: No  In the past 7 days, did you need help from others to take care of any of the following: Laundry, housekeeping, banking/finances, shopping, telephone use, food preparation, transportation, or taking medications?: (!) Yes  Select all that apply: (!) Banking/Finances, Shopping, Telephone Use, Transportation, Taking Medications  ADL Interventions:    His daughter does help him. Objective   Vitals:    12/01/22 0948   BP: (!) 172/100   Pulse: 69   Resp: 16   Temp: 97.8 °F (36.6 °C)   SpO2: 95%   Weight: 209 lb (94.8 kg)   Height: 5' 6\" (1.676 m)      Body mass index is 33.73 kg/m².       Neck: neck supple and non tender without mass, no thyromegaly or thyroid nodules, no cervical lymphadenopathy   Pulmonary/Chest: clear to auscultation bilaterally- no wheezes, rales or rhonchi, normal air movement, no respiratory distress  Cardiovascular: Irregularly irregular but no murmurs. No Known Allergies  Prior to Visit Medications    Medication Sig Taking?  Authorizing Provider   Coenzyme Q10 (CO Q 10 PO) Take by mouth Yes Historical Provider, MD   escitalopram (LEXAPRO) 5 MG tablet Take 1 tablet by mouth daily Yes Lorraine Kent MD   finasteride (PROSCAR) 5 MG tablet Take 1 tablet by mouth daily Yes JESSI Martinez - CNP   atorvastatin (LIPITOR) 80 MG tablet TAKE 1 TABLET EVERY DAY Yes Karena Man DO   amLODIPine (NORVASC) 5 MG tablet Take 1 tablet by mouth daily Yes Karena Man DO   apixaban (ELIQUIS) 5 MG TABS tablet Take 1 tablet by mouth 2 times daily TAKE 1 TABLET TWICE DAILY Yes Karena Man DO   lisinopril (PRINIVIL;ZESTRIL) 40 MG tablet Take 1 tablet by mouth daily Yes Karena Man DO   metoprolol tartrate (LOPRESSOR) 50 MG tablet Take 1 tablet by mouth 2 times daily TAKE 1 TABLET TWICE DAILY Yes Karena Man DO   acetaminophen (TYLENOL) 650 MG extended release tablet Take 650 mg by mouth every 8 hours as needed Yes Ar Automatic Reconciliation   ascorbic acid (VITAMIN C) 1000 MG tablet Take by mouth Yes Ar Automatic Reconciliation   aspirin 81 MG EC tablet Take by mouth daily Yes Ar Automatic Reconciliation   vitamin D (CHOLECALCIFEROL) 25 MCG (1000 UT) TABS tablet Take 1,000 Units by mouth daily Yes Ar Automatic Reconciliation   fluticasone (FLONASE) 50 MCG/ACT nasal spray USE 2 SPRAYS IN EACH NOSTRIL EVERY DAY Yes Ar Automatic Reconciliation   loratadine (CLARITIN) 10 MG capsule Take 1 tablet by mouth daily Yes Ar Automatic Reconciliation       CareTeam (Including outside providers/suppliers regularly involved in providing care):   Patient Care Team:  Lorraine Kent MD as PCP - General  Lorraine Kent MD as PCP - [facility] Provider     Reviewed and updated this visit:  Tobacco  Allergies  Meds  Problems  Med Hx  Surg Hx  Soc Hx  Fam Hx

## 2022-12-02 LAB
ALBUMIN SERPL-MCNC: 4.2 G/DL (ref 3.2–4.6)
ALBUMIN/GLOB SERPL: 1.4 {RATIO} (ref 0.4–1.6)
ALP SERPL-CCNC: 84 U/L (ref 50–136)
ALT SERPL-CCNC: 67 U/L (ref 12–65)
AST SERPL-CCNC: 38 U/L (ref 15–37)
BILIRUB DIRECT SERPL-MCNC: 0.4 MG/DL
BILIRUB SERPL-MCNC: 2.1 MG/DL (ref 0.2–1.1)
GLOBULIN SER CALC-MCNC: 2.9 G/DL (ref 2.8–4.5)
PROT SERPL-MCNC: 7.1 G/DL (ref 6.3–8.2)

## 2022-12-05 NOTE — RESULT ENCOUNTER NOTE
Let patient know his bilirubin is still elevated. Would like for him to consider seeing gastroenterology to determine why this is. Is he okay with this?

## 2022-12-30 ENCOUNTER — OFFICE VISIT (OUTPATIENT)
Dept: FAMILY MEDICINE CLINIC | Facility: CLINIC | Age: 87
End: 2022-12-30
Payer: MEDICARE

## 2022-12-30 VITALS
HEIGHT: 66 IN | SYSTOLIC BLOOD PRESSURE: 130 MMHG | RESPIRATION RATE: 16 BRPM | HEART RATE: 69 BPM | BODY MASS INDEX: 34.23 KG/M2 | OXYGEN SATURATION: 99 % | TEMPERATURE: 97.3 F | DIASTOLIC BLOOD PRESSURE: 80 MMHG | WEIGHT: 213 LBS

## 2022-12-30 DIAGNOSIS — F32.0 CURRENT MILD EPISODE OF MAJOR DEPRESSIVE DISORDER WITHOUT PRIOR EPISODE (HCC): Primary | ICD-10-CM

## 2022-12-30 DIAGNOSIS — I10 ESSENTIAL HYPERTENSION: ICD-10-CM

## 2022-12-30 PROCEDURE — G8427 DOCREV CUR MEDS BY ELIG CLIN: HCPCS | Performed by: FAMILY MEDICINE

## 2022-12-30 PROCEDURE — 1123F ACP DISCUSS/DSCN MKR DOCD: CPT | Performed by: FAMILY MEDICINE

## 2022-12-30 PROCEDURE — 99213 OFFICE O/P EST LOW 20 MIN: CPT | Performed by: FAMILY MEDICINE

## 2022-12-30 PROCEDURE — G8417 CALC BMI ABV UP PARAM F/U: HCPCS | Performed by: FAMILY MEDICINE

## 2022-12-30 PROCEDURE — 1036F TOBACCO NON-USER: CPT | Performed by: FAMILY MEDICINE

## 2022-12-30 PROCEDURE — G8484 FLU IMMUNIZE NO ADMIN: HCPCS | Performed by: FAMILY MEDICINE

## 2022-12-30 RX ORDER — ESCITALOPRAM OXALATE 5 MG/1
5 TABLET ORAL DAILY
Qty: 90 TABLET | Refills: 1 | Status: SHIPPED | OUTPATIENT
Start: 2022-12-30

## 2022-12-30 ASSESSMENT — PATIENT HEALTH QUESTIONNAIRE - PHQ9
7. TROUBLE CONCENTRATING ON THINGS, SUCH AS READING THE NEWSPAPER OR WATCHING TELEVISION: 1
SUM OF ALL RESPONSES TO PHQ QUESTIONS 1-9: 1
SUM OF ALL RESPONSES TO PHQ QUESTIONS 1-9: 1
9. THOUGHTS THAT YOU WOULD BE BETTER OFF DEAD, OR OF HURTING YOURSELF: 0
5. POOR APPETITE OR OVEREATING: 0
3. TROUBLE FALLING OR STAYING ASLEEP: 0
10. IF YOU CHECKED OFF ANY PROBLEMS, HOW DIFFICULT HAVE THESE PROBLEMS MADE IT FOR YOU TO DO YOUR WORK, TAKE CARE OF THINGS AT HOME, OR GET ALONG WITH OTHER PEOPLE: 0
2. FEELING DOWN, DEPRESSED OR HOPELESS: 0
8. MOVING OR SPEAKING SO SLOWLY THAT OTHER PEOPLE COULD HAVE NOTICED. OR THE OPPOSITE, BEING SO FIGETY OR RESTLESS THAT YOU HAVE BEEN MOVING AROUND A LOT MORE THAN USUAL: 0
SUM OF ALL RESPONSES TO PHQ QUESTIONS 1-9: 1
SUM OF ALL RESPONSES TO PHQ QUESTIONS 1-9: 1
SUM OF ALL RESPONSES TO PHQ9 QUESTIONS 1 & 2: 0
4. FEELING TIRED OR HAVING LITTLE ENERGY: 0
1. LITTLE INTEREST OR PLEASURE IN DOING THINGS: 0
6. FEELING BAD ABOUT YOURSELF - OR THAT YOU ARE A FAILURE OR HAVE LET YOURSELF OR YOUR FAMILY DOWN: 0

## 2022-12-30 NOTE — PROGRESS NOTES
[address] [facility] [address]  Phone: [phone] Fax [phone]  Selvin Garnett MD  12/30/2022           Mr. Raimundo Hernandez  is a 80y.o.  year old  male patient who comes in for follow-up on depression and on his blood pressure and his elevated liver tests. He is not having any side effects from the Lexapro. His daughter says that he seems to be smiling more and is happier. He did see cardiology and they were fine with him stopping his cholesterol completely as his LDL was 11. He was told by his family doctor many years ago that he had Gilbert's disease which was why his bilirubin would be elevated. He really does not want to do much investigation about it at this time. He thinks it is related to that. PHQ-9  12/30/2022   Little interest or pleasure in doing things 0   Little interest or pleasure in doing things -   Feeling down, depressed, or hopeless 0   Trouble falling or staying asleep, or sleeping too much 0   Feeling tired or having little energy 0   Poor appetite or overeating 0   Feeling bad about yourself - or that you are a failure or have let yourself or your family down 0   Trouble concentrating on things, such as reading the newspaper or watching television 1   Moving or speaking so slowly that other people could have noticed. Or the opposite - being so fidgety or restless that you have been moving around a lot more than usual 0   Thoughts that you would be better off dead, or of hurting yourself in some way 0   PHQ-2 Score 0   Total Score PHQ 2 -   PHQ-9 Total Score 1   If you checked off any problems, how difficult have these problems made it for you to do your work, take care of things at home, or get along with other people? 0           Mr. Raimundo Hernandez  has  has a past medical history of Abnormal chest x-ray, Asbestos exposure, Asbestos exposure, Benign prostatic hypertrophy, Hyperlipidemia, Sleep apnea, Stroke ([address] and Unspecified essential hypertension. Mr. Danny Tsang  has  has a past surgical history that includes orthopedic surgery; orthopedic surgery; Tonsillectomy; and other surgical history. Mr. Danny Tsang   Current Outpatient Medications   Medication Sig Dispense Refill    escitalopram (LEXAPRO) 5 MG tablet Take 1 tablet by mouth daily 90 tablet 1    Coenzyme Q10 (CO Q 10 PO) Take by mouth      finasteride (PROSCAR) 5 MG tablet Take 1 tablet by mouth daily 90 tablet 3    amLODIPine (NORVASC) 5 MG tablet Take 1 tablet by mouth daily 90 tablet 3    apixaban (ELIQUIS) 5 MG TABS tablet Take 1 tablet by mouth 2 times daily TAKE 1 TABLET TWICE DAILY 180 tablet 3    lisinopril (PRINIVIL;ZESTRIL) 40 MG tablet Take 1 tablet by mouth daily 90 tablet 3    metoprolol tartrate (LOPRESSOR) 50 MG tablet Take 1 tablet by mouth 2 times daily TAKE 1 TABLET TWICE DAILY 180 tablet 3    acetaminophen (TYLENOL) 650 MG extended release tablet Take 650 mg by mouth every 8 hours as needed      ascorbic acid (VITAMIN C) 1000 MG tablet Take by mouth      aspirin 81 MG EC tablet Take by mouth daily      vitamin D (CHOLECALCIFEROL) 25 MCG (1000 UT) TABS tablet Take 1,000 Units by mouth daily      fluticasone (FLONASE) 50 MCG/ACT nasal spray USE 2 SPRAYS IN EACH NOSTRIL EVERY DAY      loratadine (CLARITIN) 10 MG capsule Take 1 tablet by mouth daily       No current facility-administered medications for this visit. Mr. Richards Floyd History     Socioeconomic History    Marital status:       Spouse name: None    Number of children: None    Years of education: None    Highest education level: None   Tobacco Use    Smoking status: Never    Smokeless tobacco: Never   Substance and Sexual Activity    Alcohol use: Not Currently    Drug use: Never     Social Determinants of Health     Physical Activity: Insufficiently Active    Days of Exercise per Week: 1 day    Minutes of Exercise per Session: 10 min       Mr. Danny Tsang   Family History Problem Relation Age of Onset    Stroke Father     Cancer Brother     Heart Attack Son 40         secondary to MI    Hypertension Mother     Hypertension Father     Diabetes Father             Mr. Gen Caldwell  has the following allergies: No Known Allergies    /80   Pulse 69   Temp 97.3 °F (36.3 °C)   Resp 16   Ht 5' 6\" (1.676 m)   Wt 213 lb (96.6 kg)   SpO2 99%   BMI 34.38 kg/m²     HEENT: Normocephalic, atraumatic, pupils equal and reactive to light. Neck: Supple, no masses or thyromegaly. Lungs: clear to auscultation bilaterally. CV: regular rate and rhythm, without murmurs, rubs, or gallops  Ext: No lower extremity edema. Suellen Coma was seen today for anxiety. Diagnoses and all orders for this visit:    Current mild episode of major depressive disorder without prior episode (HCC)  -     escitalopram (LEXAPRO) 5 MG tablet; Take 1 tablet by mouth daily    Essential hypertension    Continue Lexapro. Continue to monitor blood pressure. We will monitor his liver tests and not do any further work-up unless his bilirubin goes even higher.   Nevaeh Wayne MD

## 2023-02-03 ENCOUNTER — TELEPHONE (OUTPATIENT)
Dept: SLEEP MEDICINE | Age: 88
End: 2023-02-03

## 2023-02-03 DIAGNOSIS — G47.33 OSA (OBSTRUCTIVE SLEEP APNEA): Primary | ICD-10-CM

## 2023-02-03 NOTE — TELEPHONE ENCOUNTER
----- Message from Raheel Mcginnis sent at 1/31/2023  1:11 PM EST -----  Regarding: FW: Events per hour  Please advise     ----- Message -----  From: Bereket Bergeron  Sent: 1/27/2023   1:25 PM EST  To: , #  Subject: Events per hour                                  January 16 - 12.1 events/hr. January 17 - 13.8 events/hr. January 18 - 10.8 events/hr. January 19 - 15.6 events/hr. January 20 - 26.8 events/hr. January 21 - 29.5 events/hr. January 22 - 18.7 events/hr. January 23 - 34.1 events/hr. January 24 - 36.0 events/hr. January 25 - 39.2 events/hr. January 26 - 32.6 events/hr. I know you turned his flow down on his CPAP when we came in to visit. Just concerning that hes having so many events. Maybe the flow needs to be adjusted up? Please let us know your thoughts.   Louie Rivas, daughter of Tabitha

## 2023-02-03 NOTE — TELEPHONE ENCOUNTER
I spoke with Miah. AHI has risen over the past few weeks. Will adjust pressure back to APAP 10-16 cm as AHI was much less on that pressure.  She denies any significant changes other than his son passing away.     Will adjust pressure to APAP 10-16 cm   She will continue to monitor AHI     Orders Placed This Encounter   Procedures    DME - DURABLE MEDICAL EQUIPMENT     [facility]  Change pressure to APAP 10-16 cm       Fátima Orourke, APRN - CNP

## 2023-03-02 RX ORDER — FLUTICASONE PROPIONATE 50 MCG
SPRAY, SUSPENSION (ML) NASAL
Qty: 48 G | Refills: 3 | Status: SHIPPED | OUTPATIENT
Start: 2023-03-02

## 2023-04-24 NOTE — PROGRESS NOTES
[address] [place] [address] Court, [address] [facility]  [phone]    Patient Name:  Reina Toledo  YOB: 1931      Office Visit 4/28/2023    CHIEF COMPLAINT:    Chief Complaint   Patient presents with    Sleep Apnea         HISTORY OF PRESENT ILLNESS:  Patient is seen today for follow-up of sleep apnea. He was diagnosed with sleep apnea in 2019 from sleep study 1/8/2019 with AHI 27.1 events per hour with lowest oxygen saturation 80%. He was prescribed auto pap 10 to 16 cm but was having a mask leak. Pressure was adjusted to APAP 10 to 14 cm but he had an increase of events. He remains on auto pap 10 to 16 cm today. Download reveals 100% compliance over the last four months with average nightly use 10 hours and 22 minutes. AHI is 12.7 events per hour with average pressure used 12.6 to 14.9 cm. He is having a mask leak with unknown apneas likely from the leak. He is accompanied by his daughter who assists with history. Patient has lost 12 pounds since his last visit. Other than weight loss, there have been no other changes that could contribute to his increase in events. He continues to drink alcohol daily but generally one beverage. This is not new. He typically starts out sleeping supine and turns to his right side. The mask that he is using is working well and he states it is comfortable. It fits well.     Download        [place] Sleepiness Scale  Sleep Medicine 4/28/2023 10/25/2022   Sitting and reading 1 0   Watching TV 2 0   Sitting, inactive in a public place (e.g. a theatre or a meeting) 1 0   As a passenger in a car for an hour without a break 0 0   Lying down to rest in the afternoon when circumstances permit 0 0   Sitting and talking to someone 0 1   Sitting quietly after a lunch without alcohol 0 1   In a car, while stopped for a few minutes in traffic 0 0   [place] Sleepiness Score 4 2          Past Medical History:   Diagnosis Date    Abnormal chest x-ray

## 2023-04-25 ENCOUNTER — OFFICE VISIT (OUTPATIENT)
Dept: CARDIOLOGY CLINIC | Age: 88
End: 2023-04-25
Payer: MEDICARE

## 2023-04-25 VITALS
BODY MASS INDEX: 32.6 KG/M2 | DIASTOLIC BLOOD PRESSURE: 82 MMHG | SYSTOLIC BLOOD PRESSURE: 138 MMHG | HEART RATE: 55 BPM | WEIGHT: 202 LBS

## 2023-04-25 DIAGNOSIS — I10 ESSENTIAL HYPERTENSION: ICD-10-CM

## 2023-04-25 DIAGNOSIS — I48.0 PAROXYSMAL ATRIAL FIBRILLATION (HCC): Primary | ICD-10-CM

## 2023-04-25 DIAGNOSIS — I71.21 ANEURYSM OF ASCENDING AORTA WITHOUT RUPTURE (HCC): ICD-10-CM

## 2023-04-25 DIAGNOSIS — G47.33 OSA (OBSTRUCTIVE SLEEP APNEA): ICD-10-CM

## 2023-04-25 DIAGNOSIS — I47.29 NSVT (NONSUSTAINED VENTRICULAR TACHYCARDIA) (HCC): ICD-10-CM

## 2023-04-25 PROCEDURE — 99214 OFFICE O/P EST MOD 30 MIN: CPT | Performed by: INTERNAL MEDICINE

## 2023-04-25 PROCEDURE — G8427 DOCREV CUR MEDS BY ELIG CLIN: HCPCS | Performed by: INTERNAL MEDICINE

## 2023-04-25 PROCEDURE — 1123F ACP DISCUSS/DSCN MKR DOCD: CPT | Performed by: INTERNAL MEDICINE

## 2023-04-25 PROCEDURE — 1036F TOBACCO NON-USER: CPT | Performed by: INTERNAL MEDICINE

## 2023-04-25 PROCEDURE — 93000 ELECTROCARDIOGRAM COMPLETE: CPT | Performed by: INTERNAL MEDICINE

## 2023-04-25 PROCEDURE — G8417 CALC BMI ABV UP PARAM F/U: HCPCS | Performed by: INTERNAL MEDICINE

## 2023-04-25 RX ORDER — ZINC GLUCONATE 50 MG
50 TABLET ORAL DAILY
COMMUNITY

## 2023-04-25 RX ORDER — VIT C/B6/B5/MAGNESIUM/HERB 173 50-5-6-5MG
500 CAPSULE ORAL DAILY
COMMUNITY

## 2023-04-25 NOTE — PROGRESS NOTES
TABS tablet Take 1 tablet by mouth 2 times daily TAKE 1 TABLET TWICE DAILY 180 tablet 3    lisinopril (PRINIVIL;ZESTRIL) 40 MG tablet Take 1 tablet by mouth daily 90 tablet 3    metoprolol tartrate (LOPRESSOR) 50 MG tablet Take 1 tablet by mouth 2 times daily TAKE 1 TABLET TWICE DAILY 180 tablet 3    acetaminophen (TYLENOL) 650 MG extended release tablet Take 1 tablet by mouth every 8 hours as needed      ascorbic acid (VITAMIN C) 1000 MG tablet Take by mouth      aspirin 81 MG EC tablet Take by mouth daily      vitamin D (CHOLECALCIFEROL) 25 MCG (1000 UT) TABS tablet Take 1 tablet by mouth daily      loratadine (CLARITIN) 10 MG capsule Take 1 tablet by mouth daily       No current facility-administered medications for this visit.         No Known Allergies  Patient Active Problem List    Diagnosis Date Noted    Current mild episode of major depressive disorder without prior episode ([facility] 2022     Priority: Medium    TIA (transient ischemic attack) 2021    Arthritis of knee 10/26/2020    Paroxysmal atrial fibrillation ([facility] 08/10/2020    Difficulty with CPAP full face mask use 2019    [place] (hard of hearing) 2019    DEMI (obstructive sleep apnea) 2019    NSVT (nonsustained ventricular tachycardia) ([facility] 10/30/2018    Essential hypertension 04/15/2016    Thoracic aortic aneurysm without rupture ([facility] 04/15/2016    Carotid artery disease ([facility] 04/15/2016    Hyperlipidemia 2013    Asbestos exposure 2013    Benign prostatic hyperplasia without lower urinary tract symptoms 2013    Abnormal chest x-ray 2013     Lung shadow          Past Surgical History:   Procedure Laterality Date    ORTHOPEDIC SURGERY      brandy in femur screw in hip    ORTHOPEDIC SURGERY      wrist fx    OTHER SURGICAL HISTORY      Bilateral wrist surgery    TONSILLECTOMY       Family History   Problem Relation Age of Onset    Stroke Father     Cancer Brother     Heart Attack Son 40

## 2023-04-27 NOTE — CONSULTS
[address] Cardiology Consult                Date of  Admission: 2021 10:43 AM     CC/Reason for consult: [address]      Jaleesa Sanford is a 80 y.o. male admitted for tia. Paf present. Neuro sxs resolved. Currently doing well.     Patient Active Problem List   Diagnosis Code    Hyperlipidemia E78.5    Benign prostatic hyperplasia without lower urinary tract symptoms N40.0    Abnormal chest x-ray R93.89    Asbestos exposure Z77.090    Carotid artery disease ([address] I77.9    Essential hypertension I10    Thoracic aortic aneurysm without rupture (HCC) I71.2    Bradycardia R00.1    NSVT (nonsustained ventricular tachycardia) (HCC) I47.2    LAZ (obstructive sleep apnea) G47.33    [place] (hard of hearing) H91.90    Difficulty with CPAP full face mask use Z78.9    Pancreatic cyst K86.2    Paroxysmal atrial fibrillation (HCC) I48.0    Arthritis of knee M17.10    Mass of arm, right R22.31    TIA (transient ischemic attack) G45.9       Past Medical History:   Diagnosis Date    Abnormal chest x-ray 2013    Lung shadow    Asbestos exposure 2013    Asbestos exposure     hx of as     Benign prostatic hypertrophy 2013    Hyperlipidemia 2013    Sleep apnea     Unspecified essential hypertension 2013      Past Surgical History:   Procedure Laterality Date    HX ORTHOPAEDIC      wrist fx    HX ORTHOPAEDIC      kirstin in femur screw in hip    HX OTHER SURGICAL      Bilateral wrist surgery    HX TONSILLECTOMY       No Known Allergies   Family History   Problem Relation Age of Onset    Heart Attack Son 40         secondary to MI    Hypertension Mother     Hypertension Father     Diabetes Father     Stroke Father     Cancer Brother         Current Facility-Administered Medications   Medication Dose Route Frequency    aspirin chewable tablet 81 mg  81 mg Oral DAILY    tamsulosin (FLOMAX) capsule 0.4 mg  0.4 mg Oral DAILY    finasteride (PROSCAR) tablet 5 mg  5 mg Oral DAILY    atorvastatin (LIPITOR) tablet 80 mg  80 mg Oral DAILY    sodium chloride (NS) flush 5-40 mL  5-40 mL IntraVENous Q8H    sodium chloride (NS) flush 5-40 mL  5-40 mL IntraVENous PRN    ondansetron (ZOFRAN) injection 4 mg  4 mg IntraVENous Q6H PRN    acetaminophen (TYLENOL) tablet 650 mg  650 mg Oral Q4H PRN    enoxaparin (LOVENOX) injection 40 mg  40 mg SubCUTAneous Q24H    tuberculin injection 5 Units  5 Units IntraDERMal ONCE    metoprolol tartrate (LOPRESSOR) tablet 25 mg  25 mg Oral BID       Review of Systems   All other systems reviewed and are negative. Physical Exam  Vitals:    01/09/21 0800 01/09/21 1200 01/09/21 1257 01/09/21 1502   BP: (!) 165/86 (!) 185/110 (!) 149/88 (!) 167/105   Pulse: 74 (!) 112 87 96   Resp: 18 18  18   Temp: 98.4 °F (36.9 °C) 98.5 °F (36.9 °C)  98 °F (36.7 °C)   SpO2: 98% 95%  94%   Weight:       Height:           Physical Exam:  General: Well Developed, Well Nourished, No Acute Distress      Cardiographics:  EKG a fib        Labs:   Recent Labs     01/09/21  0530 01/08/21  1054 01/08/21  1052   NA  --   --  139   K  --   --  3.6   BUN  --   --  17   CREA  --   --  1.16   GLU  --   --  117*   WBC  --   --  6.2   HGB  --   --  14.1   HCT  --   --  42.1   PLT  --   --  144*   INR  --  1.4* 1.3   TRIGL 35  --  48   HDL 57  --  65*        Assessment/Plan:     Assessment:     Tia  A fib    ///    He needs OAC. He understands and has consented. Will stop aspirin and Lovenox and start Eliquis this PM.    Thank you very much for this referral. We appreciate the opportunity to participate in this patient's care. We will follow along with above stated plan.     Kan Howard MD  Consulting MD: Lcl Root Units: 0 Show Additional Area 1: Yes Show Inventory Tab: Hide Show Right And Left Periorbital Units: No Dilution (U/0.1 Cc): 3 Periorbital Skin Units: 24 Consent: Written consent obtained. Risks include but not limited to lid/brow ptosis, bruising, swelling, diplopia, temporary effect, incomplete chemical denervation. Detail Level: Detailed Incrementing Botox Units: By 0.5 Units Post-Care Instructions: Patient instructed to not lie down for 4 hours and limit physical activity for 24 hours. Additional Area 1 Location: chin

## 2023-04-28 ENCOUNTER — OFFICE VISIT (OUTPATIENT)
Dept: SLEEP MEDICINE | Age: 88
End: 2023-04-28
Payer: MEDICARE

## 2023-04-28 VITALS
BODY MASS INDEX: 32.3 KG/M2 | SYSTOLIC BLOOD PRESSURE: 140 MMHG | OXYGEN SATURATION: 100 % | DIASTOLIC BLOOD PRESSURE: 90 MMHG | HEIGHT: 66 IN | RESPIRATION RATE: 16 BRPM | WEIGHT: 201 LBS | HEART RATE: 85 BPM | TEMPERATURE: 96.9 F

## 2023-04-28 DIAGNOSIS — G47.33 OSA (OBSTRUCTIVE SLEEP APNEA): Primary | ICD-10-CM

## 2023-04-28 PROCEDURE — 99213 OFFICE O/P EST LOW 20 MIN: CPT | Performed by: NURSE PRACTITIONER

## 2023-04-28 PROCEDURE — G8427 DOCREV CUR MEDS BY ELIG CLIN: HCPCS | Performed by: NURSE PRACTITIONER

## 2023-04-28 PROCEDURE — 1036F TOBACCO NON-USER: CPT | Performed by: NURSE PRACTITIONER

## 2023-04-28 PROCEDURE — 1123F ACP DISCUSS/DSCN MKR DOCD: CPT | Performed by: NURSE PRACTITIONER

## 2023-04-28 PROCEDURE — G8417 CALC BMI ABV UP PARAM F/U: HCPCS | Performed by: NURSE PRACTITIONER

## 2023-04-28 RX ORDER — SIMVASTATIN 20 MG
20 TABLET ORAL
COMMUNITY
End: 2023-04-28

## 2023-04-28 ASSESSMENT — SLEEP AND FATIGUE QUESTIONNAIRES
HOW LIKELY ARE YOU TO NOD OFF OR FALL ASLEEP WHILE SITTING AND TALKING TO SOMEONE: 0
HOW LIKELY ARE YOU TO NOD OFF OR FALL ASLEEP WHILE SITTING INACTIVE IN A PUBLIC PLACE: 1
HOW LIKELY ARE YOU TO NOD OFF OR FALL ASLEEP IN A CAR, WHILE STOPPED FOR A FEW MINUTES IN TRAFFIC: 0
HOW LIKELY ARE YOU TO NOD OFF OR FALL ASLEEP WHEN YOU ARE A PASSENGER IN A CAR FOR AN HOUR WITHOUT A BREAK: 0
HOW LIKELY ARE YOU TO NOD OFF OR FALL ASLEEP WHILE WATCHING TV: 2
ESS TOTAL SCORE: 4
HOW LIKELY ARE YOU TO NOD OFF OR FALL ASLEEP WHILE SITTING QUIETLY AFTER LUNCH WITHOUT ALCOHOL: 0
HOW LIKELY ARE YOU TO NOD OFF OR FALL ASLEEP WHILE LYING DOWN TO REST IN THE AFTERNOON WHEN CIRCUMSTANCES PERMIT: 0
HOW LIKELY ARE YOU TO NOD OFF OR FALL ASLEEP WHILE SITTING AND READING: 1

## 2023-07-11 DIAGNOSIS — F32.0 CURRENT MILD EPISODE OF MAJOR DEPRESSIVE DISORDER WITHOUT PRIOR EPISODE (HCC): ICD-10-CM

## 2023-07-11 RX ORDER — ESCITALOPRAM OXALATE 5 MG/1
TABLET ORAL
Qty: 90 TABLET | Refills: 1 | Status: SHIPPED | OUTPATIENT
Start: 2023-07-11

## 2023-08-29 ENCOUNTER — TELEPHONE (OUTPATIENT)
Dept: FAMILY MEDICINE CLINIC | Facility: CLINIC | Age: 88
End: 2023-08-29

## 2023-08-29 NOTE — TELEPHONE ENCOUNTER
D/C DATE: 08/29/2023    D/C FROM: [facility]      D/C TO: Home     PHONE NUMBER TO REACH PATIENT: [phone]    F/U APPT DATE & TIME: Please call for a one week hospital follow up

## 2023-08-30 NOTE — TELEPHONE ENCOUNTER
Care Transitions Initial Follow Up Call    Outreach made within 2 business days of discharge: Yes    Patient: Meryle Sacks Patient : 1931   MRN: [id number]  Reason for Admission: [facility]  Discharge Date:  2023      Spoke with: patient daughter    Discharge department/facility: [facility] Interactive Patient Contact:  Was patient able to fill all prescriptions: Yes  Was patient instructed to bring all medications to the follow-up visit: Yes  Is patient taking all medications as directed in the discharge summary?  Yes  Does patient understand their discharge instructions: Yes  Does patient have questions or concerns that need addressed prior to 7-14 day follow up office visit: no    Scheduled appointment with PCP within 7-14 days    Follow Up  Future Appointments   Date Time Provider [address] [place] [address]   2023 11:45 AM DO MELLISSA Machuca [facility] AMB   2023  1:10 PM FEROZ Chacko PSCD [facility] AMB   10/24/2023  1:15 PM JESSI Robison - CNP [id number] [facility] AMB   2023  9:15 AM FPA MISCELLANEOUS FPA [facility] AMB   2023  3:00 PM Walter Atwood MD FPA [facility] AMB       [place], [place]

## 2023-08-30 NOTE — TELEPHONE ENCOUNTER
He needs to see me in follow-up. Please move two of my 15-minute appointments to [facility] and put him in with me.

## 2023-09-01 ENCOUNTER — TELEPHONE (OUTPATIENT)
Dept: FAMILY MEDICINE CLINIC | Facility: CLINIC | Age: 88
End: 2023-09-01

## 2023-09-01 ENCOUNTER — TELEPHONE (OUTPATIENT)
Dept: SLEEP MEDICINE | Age: 88
End: 2023-09-01

## 2023-09-01 NOTE — TELEPHONE ENCOUNTER
Shanel Caballero called requesting a verbal for start of care. Plan: PT once a week for 9 weeks    I gave verbal to Nurse Ryley Barfield [phone] call with any other questions.

## 2023-09-04 SDOH — ECONOMIC STABILITY: HOUSING INSECURITY
IN THE LAST 12 MONTHS, WAS THERE A TIME WHEN YOU DID NOT HAVE A STEADY PLACE TO SLEEP OR SLEPT IN A SHELTER (INCLUDING NOW)?: NO

## 2023-09-04 SDOH — ECONOMIC STABILITY: TRANSPORTATION INSECURITY
IN THE PAST 12 MONTHS, HAS LACK OF TRANSPORTATION KEPT YOU FROM MEETINGS, WORK, OR FROM GETTING THINGS NEEDED FOR DAILY LIVING?: NO

## 2023-09-04 SDOH — ECONOMIC STABILITY: FOOD INSECURITY: WITHIN THE PAST 12 MONTHS, YOU WORRIED THAT YOUR FOOD WOULD RUN OUT BEFORE YOU GOT MONEY TO BUY MORE.: NEVER TRUE

## 2023-09-04 SDOH — ECONOMIC STABILITY: FOOD INSECURITY: WITHIN THE PAST 12 MONTHS, THE FOOD YOU BOUGHT JUST DIDN'T LAST AND YOU DIDN'T HAVE MONEY TO GET MORE.: NEVER TRUE

## 2023-09-04 SDOH — ECONOMIC STABILITY: INCOME INSECURITY: HOW HARD IS IT FOR YOU TO PAY FOR THE VERY BASICS LIKE FOOD, HOUSING, MEDICAL CARE, AND HEATING?: NOT HARD AT ALL

## 2023-09-06 ENCOUNTER — OFFICE VISIT (OUTPATIENT)
Dept: FAMILY MEDICINE CLINIC | Facility: CLINIC | Age: 88
End: 2023-09-06

## 2023-09-06 VITALS
DIASTOLIC BLOOD PRESSURE: 74 MMHG | BODY MASS INDEX: 30.86 KG/M2 | SYSTOLIC BLOOD PRESSURE: 134 MMHG | HEIGHT: 66 IN | WEIGHT: 192 LBS | RESPIRATION RATE: 16 BRPM | OXYGEN SATURATION: 99 % | HEART RATE: 63 BPM | TEMPERATURE: 97.3 F

## 2023-09-06 DIAGNOSIS — F32.0 CURRENT MILD EPISODE OF MAJOR DEPRESSIVE DISORDER WITHOUT PRIOR EPISODE (HCC): ICD-10-CM

## 2023-09-06 DIAGNOSIS — D69.6 LOW PLATELET COUNT (HCC): ICD-10-CM

## 2023-09-06 DIAGNOSIS — Z09 HOSPITAL DISCHARGE FOLLOW-UP: ICD-10-CM

## 2023-09-06 DIAGNOSIS — U07.1 COVID: Primary | ICD-10-CM

## 2023-09-06 LAB
ALBUMIN SERPL-MCNC: 3.6 G/DL (ref 3.2–4.6)
ALBUMIN/GLOB SERPL: 1.1 (ref 0.4–1.6)
ALP SERPL-CCNC: 120 U/L (ref 50–136)
ALT SERPL-CCNC: 45 U/L (ref 12–65)
ANION GAP SERPL CALC-SCNC: 4 MMOL/L (ref 2–11)
AST SERPL-CCNC: 29 U/L (ref 15–37)
BASOPHILS # BLD: 0.1 K/UL (ref 0–0.2)
BASOPHILS NFR BLD: 1 % (ref 0–2)
BILIRUB SERPL-MCNC: 1.5 MG/DL (ref 0.2–1.1)
BUN SERPL-MCNC: 16 MG/DL (ref 8–23)
CALCIUM SERPL-MCNC: 9.4 MG/DL (ref 8.3–10.4)
CHLORIDE SERPL-SCNC: 105 MMOL/L (ref 101–110)
CO2 SERPL-SCNC: 31 MMOL/L (ref 21–32)
CREAT SERPL-MCNC: 1.1 MG/DL (ref 0.8–1.5)
DIFFERENTIAL METHOD BLD: ABNORMAL
EOSINOPHIL # BLD: 0.1 K/UL (ref 0–0.8)
EOSINOPHIL NFR BLD: 1 % (ref 0.5–7.8)
ERYTHROCYTE [DISTWIDTH] IN BLOOD BY AUTOMATED COUNT: 13.3 % (ref 11.9–14.6)
GLOBULIN SER CALC-MCNC: 3.2 G/DL (ref 2.8–4.5)
GLUCOSE SERPL-MCNC: 117 MG/DL (ref 65–100)
HCT VFR BLD AUTO: 41.1 % (ref 41.1–50.3)
HGB BLD-MCNC: 13.8 G/DL (ref 13.6–17.2)
IMM GRANULOCYTES # BLD AUTO: 0 K/UL (ref 0–0.5)
IMM GRANULOCYTES NFR BLD AUTO: 0 % (ref 0–5)
LYMPHOCYTES # BLD: 2.3 K/UL (ref 0.5–4.6)
LYMPHOCYTES NFR BLD: 31 % (ref 13–44)
MCH RBC QN AUTO: 33.2 PG (ref 26.1–32.9)
MCHC RBC AUTO-ENTMCNC: 33.6 G/DL (ref 31.4–35)
MCV RBC AUTO: 98.8 FL (ref 82–102)
MONOCYTES # BLD: 0.7 K/UL (ref 0.1–1.3)
MONOCYTES NFR BLD: 9 % (ref 4–12)
NEUTS SEG # BLD: 4.2 K/UL (ref 1.7–8.2)
NEUTS SEG NFR BLD: 58 % (ref 43–78)
NRBC # BLD: 0 K/UL (ref 0–0.2)
PLATELET # BLD AUTO: 259 K/UL (ref 150–450)
PMV BLD AUTO: 10.4 FL (ref 9.4–12.3)
POTASSIUM SERPL-SCNC: 4.6 MMOL/L (ref 3.5–5.1)
PROT SERPL-MCNC: 6.8 G/DL (ref 6.3–8.2)
RBC # BLD AUTO: 4.16 M/UL (ref 4.23–5.6)
SODIUM SERPL-SCNC: 140 MMOL/L (ref 133–143)
WBC # BLD AUTO: 7.5 K/UL (ref 4.3–11.1)

## 2023-09-06 RX ORDER — ESCITALOPRAM OXALATE 5 MG/1
5 TABLET ORAL DAILY
Qty: 90 TABLET | Refills: 3 | Status: SHIPPED | OUTPATIENT
Start: 2023-09-06

## 2023-09-06 RX ORDER — APIXABAN 5 MG/1
TABLET, FILM COATED ORAL
Qty: 180 TABLET | Refills: 3 | Status: SHIPPED | OUTPATIENT
Start: 2023-09-06

## 2023-09-06 NOTE — PROGRESS NOTES
Post-Discharge Transitional Care Management Progress Note      Shazia Chaudhary   YOB: 1931    Date of Office Visit:  9/6/2023  Date of Hospital Admission: 8/25/23  Date of Hospital Discharge: 8/29/23    Care management risk score Rising risk (score 2-5) and Complex Care (Scores >=6): No Risk Score On File     Non face to face  following discharge, date last encounter closed (first attempt may have been earlier): 08/29/2023 08/29/2023    Call initiated 2 business days of discharge: Yes    ASSESSMENT/PLAN:   COVID  -     CBC with Auto Differential; Future  -     Comprehensive Metabolic Panel; Future  Current mild episode of major depressive disorder without prior episode (HCC)  -     escitalopram (LEXAPRO) 5 MG tablet; Take 1 tablet by mouth daily, Disp-90 tablet, R-3Normal  Low platelet count (HCC)  -     CBC with Auto Differential; Future  Hospital discharge follow-up  -     MD DISCHARGE MEDS RECONCILED W/ CURRENT OUTPATIENT MED LIST      Medical Decision Making: moderate complexity  No follow-ups on file. Subjective:   HPI:  Follow up of Hospital problems/diagnosis(es):   Patient was discovered to have COVID on August 25. He was hardly able to walk and was delirious. His daughter tried to take care of him at home but she could hardly move around and because of his mental status she was concerned about the stroke. They called 911 and they took him to the hospital.  He was admitted for 4 days. He did not require oxygen. He did slowly improve as far as his mental state. He did have a CT scan that did not show any acute process. He was discharged home and has been very weak but he is slowly getting better. He is eating and drinking fine. He did note that his platelets are down and his liver tests were slightly up. He has never had trouble with that before. He has had known [place] disease but not trouble with his liver tests.     We will recheck his platelets and liver test

## 2023-09-11 ENCOUNTER — OFFICE VISIT (OUTPATIENT)
Age: 88
End: 2023-09-11
Payer: MEDICARE

## 2023-09-11 VITALS
WEIGHT: 193.6 LBS | DIASTOLIC BLOOD PRESSURE: 62 MMHG | BODY MASS INDEX: 31.12 KG/M2 | HEART RATE: 76 BPM | HEIGHT: 66 IN | SYSTOLIC BLOOD PRESSURE: 116 MMHG

## 2023-09-11 DIAGNOSIS — I65.23 BILATERAL CAROTID ARTERY STENOSIS: ICD-10-CM

## 2023-09-11 DIAGNOSIS — I71.21 ANEURYSM OF ASCENDING AORTA WITHOUT RUPTURE (HCC): Primary | ICD-10-CM

## 2023-09-11 DIAGNOSIS — I10 ESSENTIAL HYPERTENSION: ICD-10-CM

## 2023-09-11 DIAGNOSIS — E78.2 MIXED HYPERLIPIDEMIA: ICD-10-CM

## 2023-09-11 DIAGNOSIS — I48.0 PAROXYSMAL ATRIAL FIBRILLATION (HCC): ICD-10-CM

## 2023-09-11 PROCEDURE — G8417 CALC BMI ABV UP PARAM F/U: HCPCS | Performed by: INTERNAL MEDICINE

## 2023-09-11 PROCEDURE — 99214 OFFICE O/P EST MOD 30 MIN: CPT | Performed by: INTERNAL MEDICINE

## 2023-09-11 PROCEDURE — 1123F ACP DISCUSS/DSCN MKR DOCD: CPT | Performed by: INTERNAL MEDICINE

## 2023-09-11 PROCEDURE — G8427 DOCREV CUR MEDS BY ELIG CLIN: HCPCS | Performed by: INTERNAL MEDICINE

## 2023-09-11 PROCEDURE — 1036F TOBACCO NON-USER: CPT | Performed by: INTERNAL MEDICINE

## 2023-09-11 NOTE — PROGRESS NOTES
Take by mouth      finasteride (PROSCAR) 5 MG tablet Take 1 tablet by mouth daily 90 tablet 3    amLODIPine (NORVASC) 5 MG tablet Take 1 tablet by mouth daily 90 tablet 3    lisinopril (PRINIVIL;ZESTRIL) 40 MG tablet Take 1 tablet by mouth daily 90 tablet 3    acetaminophen (TYLENOL) 650 MG extended release tablet Take 1 tablet by mouth every 8 hours as needed      ascorbic acid (VITAMIN C) 1000 MG tablet Take by mouth      aspirin 81 MG EC tablet Take by mouth daily      vitamin D (CHOLECALCIFEROL) 25 MCG (1000 UT) TABS tablet Take 1 tablet by mouth daily      loratadine (CLARITIN) 10 MG capsule Take 1 tablet by mouth daily       No current facility-administered medications for this visit.         No Known Allergies  Patient Active Problem List    Diagnosis Date Noted    Current mild episode of major depressive disorder without prior episode ([address]) 2022     Priority: Medium    TIA (transient ischemic attack) 2021    Arthritis of knee 10/26/2020    Paroxysmal atrial fibrillation ([address]) 08/10/2020    Difficulty with CPAP full face mask use 2019    [place] (hard of hearing) 2019    DEMI (obstructive sleep apnea) 2019    NSVT (nonsustained ventricular tachycardia) ([address]) 10/30/2018    Essential hypertension 04/15/2016    Thoracic aortic aneurysm without rupture ([address]) 04/15/2016    Carotid artery disease ([address]) 04/15/2016    Hyperlipidemia 2013    Asbestos exposure 2013    Benign prostatic hyperplasia without lower urinary tract symptoms 2013    Abnormal chest x-ray 2013     Lung shadow          Past Surgical History:   Procedure Laterality Date    ORTHOPEDIC SURGERY      brandy in femur screw in hip    ORTHOPEDIC SURGERY      wrist fx    OTHER SURGICAL HISTORY      Bilateral wrist surgery    TONSILLECTOMY       Family History   Problem Relation Age of Onset    Stroke Father     Cancer Brother     Heart Attack Son 40         secondary to MI    Hypertension Mother

## 2023-09-27 ENCOUNTER — OFFICE VISIT (OUTPATIENT)
Dept: SLEEP MEDICINE | Age: 88
End: 2023-09-27
Payer: MEDICARE

## 2023-09-27 VITALS
HEART RATE: 68 BPM | BODY MASS INDEX: 31.39 KG/M2 | HEIGHT: 67 IN | RESPIRATION RATE: 16 BRPM | OXYGEN SATURATION: 99 % | SYSTOLIC BLOOD PRESSURE: 128 MMHG | WEIGHT: 200 LBS | DIASTOLIC BLOOD PRESSURE: 76 MMHG

## 2023-09-27 DIAGNOSIS — G47.33 OSA (OBSTRUCTIVE SLEEP APNEA): Primary | ICD-10-CM

## 2023-09-27 DIAGNOSIS — G47.34 NOCTURNAL HYPOXEMIA: ICD-10-CM

## 2023-09-27 PROCEDURE — G8417 CALC BMI ABV UP PARAM F/U: HCPCS | Performed by: PHYSICIAN ASSISTANT

## 2023-09-27 PROCEDURE — 1123F ACP DISCUSS/DSCN MKR DOCD: CPT | Performed by: PHYSICIAN ASSISTANT

## 2023-09-27 PROCEDURE — 1036F TOBACCO NON-USER: CPT | Performed by: PHYSICIAN ASSISTANT

## 2023-09-27 PROCEDURE — 99214 OFFICE O/P EST MOD 30 MIN: CPT | Performed by: PHYSICIAN ASSISTANT

## 2023-09-27 PROCEDURE — G8427 DOCREV CUR MEDS BY ELIG CLIN: HCPCS | Performed by: PHYSICIAN ASSISTANT

## 2023-09-27 ASSESSMENT — SLEEP AND FATIGUE QUESTIONNAIRES
HOW LIKELY ARE YOU TO NOD OFF OR FALL ASLEEP WHILE SITTING INACTIVE IN A PUBLIC PLACE: 0
HOW LIKELY ARE YOU TO NOD OFF OR FALL ASLEEP WHILE WATCHING TV: 1
ESS TOTAL SCORE: 4
HOW LIKELY ARE YOU TO NOD OFF OR FALL ASLEEP WHILE SITTING QUIETLY AFTER LUNCH WITHOUT ALCOHOL: 1
HOW LIKELY ARE YOU TO NOD OFF OR FALL ASLEEP WHEN YOU ARE A PASSENGER IN A CAR FOR AN HOUR WITHOUT A BREAK: 0
HOW LIKELY ARE YOU TO NOD OFF OR FALL ASLEEP WHILE LYING DOWN TO REST IN THE AFTERNOON WHEN CIRCUMSTANCES PERMIT: 1
HOW LIKELY ARE YOU TO NOD OFF OR FALL ASLEEP IN A CAR, WHILE STOPPED FOR A FEW MINUTES IN TRAFFIC: 0
HOW LIKELY ARE YOU TO NOD OFF OR FALL ASLEEP WHILE SITTING AND READING: 1
HOW LIKELY ARE YOU TO NOD OFF OR FALL ASLEEP WHILE SITTING AND TALKING TO SOMEONE: 0

## 2023-09-27 NOTE — PROGRESS NOTES
Disposable Filter (2 per mon)  (   x  )-Gvuqii Humidifier (1 per year)     ( x    )-Ryndawvdu (sometimes used with Full Face Mask) (1 per 6 mos)  (    )-Tubing-without heat (1 per 3 mos)  (     )-Non-Disposable Filter (1 per 6 mos)  (  x   )-Water Chamber (1 per 6 mos)  (     )-Humidifier non-heated (1 per 5 yrs)      Signed Date: 9/27/2023  Electronically Signed By: FEROZ Callahan  Electronically Dated:  9/27/2023     No orders of the defined types were placed in this encounter. Collaborating Physician: Dr. Morro Borrego    Over 50% of today's office visit was spent in face to face time reviewing test results, prognosis, importance of compliance, education about disease process, benefits of medications, instructions for management of acute flare-ups, and follow up plans. Total face to face time spent with patient was 30 minutes.         FEROZ Callahan  Electronically signed

## 2023-09-28 ENCOUNTER — TELEPHONE (OUTPATIENT)
Dept: SLEEP MEDICINE | Age: 88
End: 2023-09-28

## 2023-09-28 NOTE — TELEPHONE ENCOUNTER
Pt had a size small mask yesterday and he was given a size medium mask in the office. Stefani Blanchard did the mask fit and can confirm this. She can increase to a size large mask if she would like him to but medium was a great fit yesterday in the office. Thanks!

## 2023-10-02 ENCOUNTER — TELEPHONE (OUTPATIENT)
Dept: FAMILY MEDICINE CLINIC | Facility: CLINIC | Age: 88
End: 2023-10-02

## 2023-10-02 DIAGNOSIS — M17.10 ARTHRITIS OF KNEE: Primary | ICD-10-CM

## 2023-10-02 NOTE — TELEPHONE ENCOUNTER
Pt's daughter asking for a new rx for a walker. Pt's daughter wants to  the rx so she can take it to [facility]. His roller on his old one are not working well and its getting stuck when he is trying to walk. Please call Jovana Arevalo back at [phone].

## 2023-10-09 RX ORDER — FINASTERIDE 5 MG/1
5 TABLET, FILM COATED ORAL DAILY
Qty: 90 TABLET | Refills: 3 | Status: SHIPPED | OUTPATIENT
Start: 2023-10-09

## 2023-10-20 ENCOUNTER — TELEPHONE (OUTPATIENT)
Dept: SLEEP MEDICINE | Age: 88
End: 2023-10-20

## 2023-10-20 DIAGNOSIS — G47.33 OSA (OBSTRUCTIVE SLEEP APNEA): Primary | ICD-10-CM

## 2023-10-20 NOTE — TELEPHONE ENCOUNTER
I called and spoke with patient's daughter has been tracking his data, he is unable to get a new mask because he is not eligible till December, his events are anywhere from 35-40 at night per hour and he is having nonrestorative sleep. We have tried making empiric adjustments as an outpatient and this has not worked and at this point I think is necessary get him into the sleep lab which daughter is in agreement with. I will order a priority study, explained that he may end up with a different type of therapy at the end of the night but certainly they can also work on finding the best mask that would fit him and prevent high leak. We will call about 2 to 3 weeks after the study to review the results and likely get started on his new therapy at that time.

## 2023-10-20 NOTE — TELEPHONE ENCOUNTER
Alternate     Leila Rivrea (Child)   [phone] (Home Phone   Spoke to daughter, Amey Lesches. She is wanting to follow up on her fathers increased events and leak. Please advise.          Lavaughn Gitelman, MA

## 2023-10-24 ENCOUNTER — OFFICE VISIT (OUTPATIENT)
Dept: UROLOGY | Age: 88
End: 2023-10-24
Payer: MEDICARE

## 2023-10-24 DIAGNOSIS — N40.1 BPH WITH OBSTRUCTION/LOWER URINARY TRACT SYMPTOMS: Primary | ICD-10-CM

## 2023-10-24 DIAGNOSIS — R31.0 GROSS HEMATURIA: ICD-10-CM

## 2023-10-24 DIAGNOSIS — N13.8 BPH WITH OBSTRUCTION/LOWER URINARY TRACT SYMPTOMS: Primary | ICD-10-CM

## 2023-10-24 LAB
BILIRUBIN, URINE, POC: NEGATIVE
BLOOD URINE, POC: NORMAL
GLUCOSE URINE, POC: NEGATIVE
KETONES, URINE, POC: NEGATIVE
LEUKOCYTE ESTERASE, URINE, POC: NEGATIVE
NITRITE, URINE, POC: NEGATIVE
PH, URINE, POC: 7 (ref 4.6–8)
PROTEIN,URINE, POC: 30
SPECIFIC GRAVITY, URINE, POC: 1.02 (ref 1–1.03)
URINALYSIS CLARITY, POC: NORMAL
URINALYSIS COLOR, POC: NORMAL
UROBILINOGEN, POC: NORMAL

## 2023-10-24 PROCEDURE — 1123F ACP DISCUSS/DSCN MKR DOCD: CPT | Performed by: NURSE PRACTITIONER

## 2023-10-24 PROCEDURE — 1036F TOBACCO NON-USER: CPT | Performed by: NURSE PRACTITIONER

## 2023-10-24 PROCEDURE — G8484 FLU IMMUNIZE NO ADMIN: HCPCS | Performed by: NURSE PRACTITIONER

## 2023-10-24 PROCEDURE — G8427 DOCREV CUR MEDS BY ELIG CLIN: HCPCS | Performed by: NURSE PRACTITIONER

## 2023-10-24 PROCEDURE — 99214 OFFICE O/P EST MOD 30 MIN: CPT | Performed by: NURSE PRACTITIONER

## 2023-10-24 PROCEDURE — 81003 URINALYSIS AUTO W/O SCOPE: CPT | Performed by: NURSE PRACTITIONER

## 2023-10-24 PROCEDURE — G8417 CALC BMI ABV UP PARAM F/U: HCPCS | Performed by: NURSE PRACTITIONER

## 2023-10-24 ASSESSMENT — ENCOUNTER SYMPTOMS
NAUSEA: 0
BACK PAIN: 0

## 2023-10-24 NOTE — PROGRESS NOTES
Review of Systems  Constitutional:   Negative for fever. GI:  Negative for nausea. Musculoskeletal:  Negative for back pain. Urinalysis  UA - Dipstick  Results for orders placed or performed in visit on 10/24/23   AMB POC URINALYSIS DIP STICK AUTO W/O MICRO   Result Value Ref Range    Color (UA POC)      Clarity (UA POC)      Glucose, Urine, POC Negative Negative    Bilirubin, Urine, POC Negative Negative    KETONES, Urine, POC Negative Negative    Specific Gravity, Urine, POC 1.025 1.001 - 1.035    Blood (UA POC) Trace-intact Negative    pH, Urine, POC 7.0 4.6 - 8.0    Protein, Urine, POC 30 Negative    Urobilinogen, POC 0.2 mg/dL     Nitrite, Urine, POC Negative Negative    Leukocyte Esterase, Urine, POC Negative Negative       UA - Micro  WBC - 0  RBC - 0  Bacteria - 0  Epith - 0    PHYSICAL EXAM    General appearance - well appearing and in no distress  Mental status - alert, oriented to person, place, and time  Neck - supple, no significant adenopathy  Chest/Lung-  Quiet, even and easy respiratory effort without use of accessory muscles  Skin - normal coloration and turgor, no rashes      Assessment and Plan    ICD-10-CM    1. BPH with obstruction/lower urinary tract symptoms  N40.1 AMB POC URINALYSIS DIP STICK AUTO W/O MICRO    N13.8       2. Gross hematuria  R31.0 AMB POC URINALYSIS DIP STICK AUTO W/O MICRO          Gross hematuria- urine normal. No recurrence. Cont to follow. BPH/LUTS- stable. Cont finasteride 5 mg PO daily. RTO in 1 year for follow up. Advised to call sooner if symptoms worsen. Rodríguez Rodriguez is supervising physician today and he approves plan of care.

## 2023-11-28 ENCOUNTER — HOSPITAL ENCOUNTER (OUTPATIENT)
Dept: SLEEP CENTER | Age: 88
Discharge: HOME | End: 2023-12-01
Payer: MEDICARE

## 2023-11-28 PROCEDURE — 95811 POLYSOM 6/>YRS CPAP 4/> PARM: CPT

## 2023-11-29 ENCOUNTER — NURSE ONLY (OUTPATIENT)
Dept: FAMILY MEDICINE CLINIC | Facility: CLINIC | Age: 88
End: 2023-11-29

## 2023-11-29 DIAGNOSIS — I10 ESSENTIAL HYPERTENSION: ICD-10-CM

## 2023-11-29 DIAGNOSIS — E78.2 MIXED HYPERLIPIDEMIA: ICD-10-CM

## 2023-11-29 LAB
ALBUMIN SERPL-MCNC: 3.8 G/DL (ref 3.2–4.6)
ALBUMIN/GLOB SERPL: 1.2 (ref 0.4–1.6)
ALP SERPL-CCNC: 100 U/L (ref 50–136)
ALT SERPL-CCNC: 72 U/L (ref 12–65)
ANION GAP SERPL CALC-SCNC: 6 MMOL/L (ref 2–11)
AST SERPL-CCNC: 42 U/L (ref 15–37)
BASOPHILS # BLD: 0 K/UL (ref 0–0.2)
BASOPHILS NFR BLD: 1 % (ref 0–2)
BILIRUB SERPL-MCNC: 2.5 MG/DL (ref 0.2–1.1)
BUN SERPL-MCNC: 19 MG/DL (ref 8–23)
CALCIUM SERPL-MCNC: 9.3 MG/DL (ref 8.3–10.4)
CHLORIDE SERPL-SCNC: 107 MMOL/L (ref 101–110)
CHOLEST SERPL-MCNC: 152 MG/DL
CO2 SERPL-SCNC: 27 MMOL/L (ref 21–32)
CREAT SERPL-MCNC: 1.1 MG/DL (ref 0.8–1.5)
DIFFERENTIAL METHOD BLD: ABNORMAL
EOSINOPHIL # BLD: 0.2 K/UL (ref 0–0.8)
EOSINOPHIL NFR BLD: 2 % (ref 0.5–7.8)
ERYTHROCYTE [DISTWIDTH] IN BLOOD BY AUTOMATED COUNT: 14.3 % (ref 11.9–14.6)
GLOBULIN SER CALC-MCNC: 3.1 G/DL (ref 2.8–4.5)
GLUCOSE SERPL-MCNC: 93 MG/DL (ref 65–100)
HCT VFR BLD AUTO: 40.9 % (ref 41.1–50.3)
HDLC SERPL-MCNC: 68 MG/DL (ref 40–60)
HDLC SERPL: 2.2
HGB BLD-MCNC: 13.2 G/DL (ref 13.6–17.2)
IMM GRANULOCYTES # BLD AUTO: 0 K/UL (ref 0–0.5)
IMM GRANULOCYTES NFR BLD AUTO: 1 % (ref 0–5)
LDLC SERPL CALC-MCNC: 76.6 MG/DL
LYMPHOCYTES # BLD: 2.7 K/UL (ref 0.5–4.6)
LYMPHOCYTES NFR BLD: 38 % (ref 13–44)
MCH RBC QN AUTO: 33.1 PG (ref 26.1–32.9)
MCHC RBC AUTO-ENTMCNC: 32.3 G/DL (ref 31.4–35)
MCV RBC AUTO: 102.5 FL (ref 82–102)
MONOCYTES # BLD: 0.6 K/UL (ref 0.1–1.3)
MONOCYTES NFR BLD: 8 % (ref 4–12)
NEUTS SEG # BLD: 3.6 K/UL (ref 1.7–8.2)
NEUTS SEG NFR BLD: 50 % (ref 43–78)
NRBC # BLD: 0 K/UL (ref 0–0.2)
PLATELET # BLD AUTO: 141 K/UL (ref 150–450)
PMV BLD AUTO: 11.6 FL (ref 9.4–12.3)
POTASSIUM SERPL-SCNC: 4 MMOL/L (ref 3.5–5.1)
PROT SERPL-MCNC: 6.9 G/DL (ref 6.3–8.2)
RBC # BLD AUTO: 3.99 M/UL (ref 4.23–5.6)
SODIUM SERPL-SCNC: 140 MMOL/L (ref 133–143)
TRIGL SERPL-MCNC: 37 MG/DL (ref 35–150)
VLDLC SERPL CALC-MCNC: 7.4 MG/DL (ref 6–23)
WBC # BLD AUTO: 7.1 K/UL (ref 4.3–11.1)

## 2023-12-04 SDOH — HEALTH STABILITY: PHYSICAL HEALTH: ON AVERAGE, HOW MANY MINUTES DO YOU ENGAGE IN EXERCISE AT THIS LEVEL?: 20 MIN

## 2023-12-04 SDOH — HEALTH STABILITY: PHYSICAL HEALTH: ON AVERAGE, HOW MANY DAYS PER WEEK DO YOU ENGAGE IN MODERATE TO STRENUOUS EXERCISE (LIKE A BRISK WALK)?: 1 DAY

## 2023-12-04 ASSESSMENT — PATIENT HEALTH QUESTIONNAIRE - PHQ9
SUM OF ALL RESPONSES TO PHQ9 QUESTIONS 1 & 2: 2
SUM OF ALL RESPONSES TO PHQ QUESTIONS 1-9: 2
1. LITTLE INTEREST OR PLEASURE IN DOING THINGS: 1
2. FEELING DOWN, DEPRESSED OR HOPELESS: 1
SUM OF ALL RESPONSES TO PHQ QUESTIONS 1-9: 2

## 2023-12-04 ASSESSMENT — LIFESTYLE VARIABLES
HAVE YOU OR SOMEONE ELSE BEEN INJURED AS A RESULT OF YOUR DRINKING: 0
HOW OFTEN DO YOU HAVE A DRINK CONTAINING ALCOHOL: 5
HOW OFTEN DURING THE LAST YEAR HAVE YOU HAD A FEELING OF GUILT OR REMORSE AFTER DRINKING: NEVER
HOW OFTEN DO YOU HAVE SIX OR MORE DRINKS ON ONE OCCASION: 1
HOW OFTEN DURING THE LAST YEAR HAVE YOU NEEDED AN ALCOHOLIC DRINK FIRST THING IN THE MORNING TO GET YOURSELF GOING AFTER A NIGHT OF HEAVY DRINKING: NEVER
HOW OFTEN DURING THE LAST YEAR HAVE YOU HAD A FEELING OF GUILT OR REMORSE AFTER DRINKING: 0
HOW OFTEN DO YOU HAVE A DRINK CONTAINING ALCOHOL: 4 OR MORE TIMES A WEEK
HAVE YOU OR SOMEONE ELSE BEEN INJURED AS A RESULT OF YOUR DRINKING: NO
HOW OFTEN DURING THE LAST YEAR HAVE YOU NEEDED AN ALCOHOLIC DRINK FIRST THING IN THE MORNING TO GET YOURSELF GOING AFTER A NIGHT OF HEAVY DRINKING: 0
HOW OFTEN DURING THE LAST YEAR HAVE YOU BEEN UNABLE TO REMEMBER WHAT HAPPENED THE NIGHT BEFORE BECAUSE YOU HAD BEEN DRINKING: 0
HAS A RELATIVE, FRIEND, DOCTOR, OR ANOTHER HEALTH PROFESSIONAL EXPRESSED CONCERN ABOUT YOUR DRINKING OR SUGGESTED YOU CUT DOWN: NO
HOW OFTEN DURING THE LAST YEAR HAVE YOU FAILED TO DO WHAT WAS NORMALLY EXPECTED FROM YOU BECAUSE OF DRINKING: 0
HOW OFTEN DURING THE LAST YEAR HAVE YOU FOUND THAT YOU WERE NOT ABLE TO STOP DRINKING ONCE YOU HAD STARTED: 0
HOW OFTEN DURING THE LAST YEAR HAVE YOU BEEN UNABLE TO REMEMBER WHAT HAPPENED THE NIGHT BEFORE BECAUSE YOU HAD BEEN DRINKING: NEVER
HOW OFTEN DURING THE LAST YEAR HAVE YOU FOUND THAT YOU WERE NOT ABLE TO STOP DRINKING ONCE YOU HAD STARTED: NEVER
HOW OFTEN DURING THE LAST YEAR HAVE YOU FAILED TO DO WHAT WAS NORMALLY EXPECTED FROM YOU BECAUSE OF DRINKING: NEVER
HOW MANY STANDARD DRINKS CONTAINING ALCOHOL DO YOU HAVE ON A TYPICAL DAY: 1 OR 2
HAS A RELATIVE, FRIEND, DOCTOR, OR ANOTHER HEALTH PROFESSIONAL EXPRESSED CONCERN ABOUT YOUR DRINKING OR SUGGESTED YOU CUT DOWN: 0
HOW MANY STANDARD DRINKS CONTAINING ALCOHOL DO YOU HAVE ON A TYPICAL DAY: 1

## 2023-12-05 ENCOUNTER — TELEPHONE (OUTPATIENT)
Dept: SLEEP MEDICINE | Age: 88
End: 2023-12-05

## 2023-12-05 NOTE — TELEPHONE ENCOUNTER
Patients daughter Sky Kelsey needs the results from the SS of her father . She has to carry patient to go to [address] [place] [address] next week to be fitted for mask cushions  .  She says that the fitting  would probably be  better if they know sorta how the machine will be set for treatment

## 2023-12-06 ENCOUNTER — OFFICE VISIT (OUTPATIENT)
Dept: FAMILY MEDICINE CLINIC | Facility: CLINIC | Age: 88
End: 2023-12-06
Payer: MEDICARE

## 2023-12-06 VITALS
HEART RATE: 90 BPM | WEIGHT: 202 LBS | OXYGEN SATURATION: 97 % | SYSTOLIC BLOOD PRESSURE: 180 MMHG | BODY MASS INDEX: 32.47 KG/M2 | TEMPERATURE: 97.3 F | HEIGHT: 66 IN | RESPIRATION RATE: 16 BRPM | DIASTOLIC BLOOD PRESSURE: 107 MMHG

## 2023-12-06 DIAGNOSIS — Z00.00 MEDICARE ANNUAL WELLNESS VISIT, SUBSEQUENT: Primary | ICD-10-CM

## 2023-12-06 DIAGNOSIS — M17.10 ARTHRITIS OF KNEE: ICD-10-CM

## 2023-12-06 DIAGNOSIS — I48.0 PAROXYSMAL ATRIAL FIBRILLATION (HCC): ICD-10-CM

## 2023-12-06 DIAGNOSIS — G47.33 OSA (OBSTRUCTIVE SLEEP APNEA): ICD-10-CM

## 2023-12-06 DIAGNOSIS — I10 ESSENTIAL HYPERTENSION: ICD-10-CM

## 2023-12-06 DIAGNOSIS — N40.0 BENIGN PROSTATIC HYPERPLASIA WITHOUT LOWER URINARY TRACT SYMPTOMS: ICD-10-CM

## 2023-12-06 DIAGNOSIS — E78.2 MIXED HYPERLIPIDEMIA: ICD-10-CM

## 2023-12-06 DIAGNOSIS — U09.9 POST-COVID SYNDROME: ICD-10-CM

## 2023-12-06 DIAGNOSIS — Z23 NEED FOR PNEUMOCOCCAL 20-VALENT CONJUGATE VACCINATION: ICD-10-CM

## 2023-12-06 DIAGNOSIS — F32.0 CURRENT MILD EPISODE OF MAJOR DEPRESSIVE DISORDER WITHOUT PRIOR EPISODE (HCC): ICD-10-CM

## 2023-12-06 DIAGNOSIS — I71.21 ANEURYSM OF ASCENDING AORTA WITHOUT RUPTURE (HCC): ICD-10-CM

## 2023-12-06 DIAGNOSIS — I65.23 BILATERAL CAROTID ARTERY STENOSIS: ICD-10-CM

## 2023-12-06 DIAGNOSIS — H91.90 HEARING LOSS, UNSPECIFIED HEARING LOSS TYPE, UNSPECIFIED LATERALITY: ICD-10-CM

## 2023-12-06 DIAGNOSIS — I47.29 NSVT (NONSUSTAINED VENTRICULAR TACHYCARDIA) (HCC): ICD-10-CM

## 2023-12-06 PROCEDURE — 99214 OFFICE O/P EST MOD 30 MIN: CPT | Performed by: FAMILY MEDICINE

## 2023-12-06 PROCEDURE — G0009 ADMIN PNEUMOCOCCAL VACCINE: HCPCS | Performed by: FAMILY MEDICINE

## 2023-12-06 PROCEDURE — 1036F TOBACCO NON-USER: CPT | Performed by: FAMILY MEDICINE

## 2023-12-06 PROCEDURE — 90677 PCV20 VACCINE IM: CPT | Performed by: FAMILY MEDICINE

## 2023-12-06 PROCEDURE — 1123F ACP DISCUSS/DSCN MKR DOCD: CPT | Performed by: FAMILY MEDICINE

## 2023-12-06 PROCEDURE — G8427 DOCREV CUR MEDS BY ELIG CLIN: HCPCS | Performed by: FAMILY MEDICINE

## 2023-12-06 PROCEDURE — G8417 CALC BMI ABV UP PARAM F/U: HCPCS | Performed by: FAMILY MEDICINE

## 2023-12-06 PROCEDURE — G0439 PPPS, SUBSEQ VISIT: HCPCS | Performed by: FAMILY MEDICINE

## 2023-12-06 PROCEDURE — G8484 FLU IMMUNIZE NO ADMIN: HCPCS | Performed by: FAMILY MEDICINE

## 2023-12-06 RX ORDER — LISINOPRIL 40 MG/1
40 TABLET ORAL DAILY
Qty: 90 TABLET | Refills: 3 | Status: SHIPPED | OUTPATIENT
Start: 2023-12-06

## 2023-12-06 RX ORDER — ESCITALOPRAM OXALATE 5 MG/1
5 TABLET ORAL DAILY
Qty: 90 TABLET | Refills: 3 | Status: SHIPPED | OUTPATIENT
Start: 2023-12-06

## 2023-12-06 NOTE — PROGRESS NOTES
turmeric 500 MG CAPS Take 1 capsule by mouth daily Yes Provider, MD Marbella   fluticasone (FLONASE) 50 MCG/ACT nasal spray USE 2 SPRAYS IN EACH NOSTRIL EVERY DAY Yes Sharon Tabares MD   Coenzyme Q10 (CO Q 10 PO) Take by mouth Yes ProviderMarbella MD   acetaminophen (TYLENOL) 650 MG extended release tablet Take 1 tablet by mouth every 8 hours as needed Yes Automatic Reconciliation, Ar   ascorbic acid (VITAMIN C) 1000 MG tablet Take by mouth Yes Automatic Reconciliation, Ar   aspirin 81 MG EC tablet Take by mouth daily Yes Automatic Reconciliation, Ar   loratadine (CLARITIN) 10 MG capsule Take 1 tablet by mouth daily Yes Automatic Reconciliation, Ar       CareTeam (Including outside providers/suppliers regularly involved in providing care):   Patient Care Team:  Jose Enrique Smith MD as PCP - Neris Munoz MD as PCP - Empaneled Provider     Reviewed and updated this visit:  Tobacco  Allergies  Meds  Problems  Med Hx  Surg Hx  Soc Hx  Fam Hx

## 2023-12-08 DIAGNOSIS — G47.31 PRIMARY CENTRAL SLEEP APNEA: Primary | ICD-10-CM

## 2023-12-08 NOTE — TELEPHONE ENCOUNTER
Spoke to patient's daughter she is aware the study will be read today and Dewayne Almendarez will have the order for the new machine Bipap ST.

## 2024-01-05 ENCOUNTER — TELEPHONE (OUTPATIENT)
Dept: SLEEP MEDICINE | Age: 89
End: 2024-01-05

## 2024-01-05 DIAGNOSIS — G47.34 NOCTURNAL HYPOXEMIA: ICD-10-CM

## 2024-01-05 DIAGNOSIS — G47.33 OSA (OBSTRUCTIVE SLEEP APNEA): Primary | ICD-10-CM

## 2024-01-05 NOTE — TELEPHONE ENCOUNTER
Please advise ----- Message from Vic Nunez sent at 1/5/2024 10:54 AM EST -----  Regarding: Sleep study   Contact: [phone]  Fátima hubbard is Vic’s daughter. My Dad had a sleep study in November and never received any results. We went to [facility] and found out not only was he changed to a bi-pap he also was given an oxygenation machine.  I would’ve thought your office would’ve done there due diligence.  If you look at his numbers they are ridiculously high in comparison to before the sleep study.  Something’s not working.  He doesn’t have an appointment until March 7th. I’m very concerned waiting until March.  Please let me know what needs to happen.    Thanks,  Miah Venegas   [phone]

## 2024-01-07 ASSESSMENT — PATIENT HEALTH QUESTIONNAIRE - PHQ9
7. TROUBLE CONCENTRATING ON THINGS, SUCH AS READING THE NEWSPAPER OR WATCHING TELEVISION: SEVERAL DAYS
SUM OF ALL RESPONSES TO PHQ9 QUESTIONS 1 & 2: 2
5. POOR APPETITE OR OVEREATING: NEARLY EVERY DAY
9. THOUGHTS THAT YOU WOULD BE BETTER OFF DEAD, OR OF HURTING YOURSELF: 0
2. FEELING DOWN, DEPRESSED OR HOPELESS: SEVERAL DAYS
6. FEELING BAD ABOUT YOURSELF - OR THAT YOU ARE A FAILURE OR HAVE LET YOURSELF OR YOUR FAMILY DOWN: 0
8. MOVING OR SPEAKING SO SLOWLY THAT OTHER PEOPLE COULD HAVE NOTICED. OR THE OPPOSITE - BEING SO FIDGETY OR RESTLESS THAT YOU HAVE BEEN MOVING AROUND A LOT MORE THAN USUAL: NEARLY EVERY DAY
10. IF YOU CHECKED OFF ANY PROBLEMS, HOW DIFFICULT HAVE THESE PROBLEMS MADE IT FOR YOU TO DO YOUR WORK, TAKE CARE OF THINGS AT HOME, OR GET ALONG WITH OTHER PEOPLE: NOT DIFFICULT AT ALL
4. FEELING TIRED OR HAVING LITTLE ENERGY: NEARLY EVERY DAY
10. IF YOU CHECKED OFF ANY PROBLEMS, HOW DIFFICULT HAVE THESE PROBLEMS MADE IT FOR YOU TO DO YOUR WORK, TAKE CARE OF THINGS AT HOME, OR GET ALONG WITH OTHER PEOPLE: 0
3. TROUBLE FALLING OR STAYING ASLEEP: MORE THAN HALF THE DAYS
8. MOVING OR SPEAKING SO SLOWLY THAT OTHER PEOPLE COULD HAVE NOTICED. OR THE OPPOSITE, BEING SO FIGETY OR RESTLESS THAT YOU HAVE BEEN MOVING AROUND A LOT MORE THAN USUAL: 3
SUM OF ALL RESPONSES TO PHQ QUESTIONS 1-9: 14
SUM OF ALL RESPONSES TO PHQ QUESTIONS 1-9: 14
2. FEELING DOWN, DEPRESSED OR HOPELESS: 1
6. FEELING BAD ABOUT YOURSELF - OR THAT YOU ARE A FAILURE OR HAVE LET YOURSELF OR YOUR FAMILY DOWN: NOT AT ALL
1. LITTLE INTEREST OR PLEASURE IN DOING THINGS: 1
5. POOR APPETITE OR OVEREATING: 3
9. THOUGHTS THAT YOU WOULD BE BETTER OFF DEAD, OR OF HURTING YOURSELF: NOT AT ALL
1. LITTLE INTEREST OR PLEASURE IN DOING THINGS: SEVERAL DAYS
SUM OF ALL RESPONSES TO PHQ QUESTIONS 1-9: 14
SUM OF ALL RESPONSES TO PHQ QUESTIONS 1-9: 14
4. FEELING TIRED OR HAVING LITTLE ENERGY: 3
7. TROUBLE CONCENTRATING ON THINGS, SUCH AS READING THE NEWSPAPER OR WATCHING TELEVISION: 1
SUM OF ALL RESPONSES TO PHQ QUESTIONS 1-9: 14
3. TROUBLE FALLING OR STAYING ASLEEP: 2

## 2024-01-08 ENCOUNTER — OFFICE VISIT (OUTPATIENT)
Dept: FAMILY MEDICINE CLINIC | Facility: CLINIC | Age: 89
End: 2024-01-08
Payer: MEDICARE

## 2024-01-08 VITALS
WEIGHT: 202 LBS | SYSTOLIC BLOOD PRESSURE: 162 MMHG | DIASTOLIC BLOOD PRESSURE: 107 MMHG | HEART RATE: 52 BPM | TEMPERATURE: 98.3 F | BODY MASS INDEX: 32.47 KG/M2 | HEIGHT: 66 IN | RESPIRATION RATE: 16 BRPM | OXYGEN SATURATION: 100 %

## 2024-01-08 DIAGNOSIS — R47.89 EPISODE OF CHANGE IN SPEECH: ICD-10-CM

## 2024-01-08 DIAGNOSIS — I10 ESSENTIAL HYPERTENSION: ICD-10-CM

## 2024-01-08 DIAGNOSIS — G47.30 SLEEP APNEA, UNSPECIFIED TYPE: Primary | ICD-10-CM

## 2024-01-08 DIAGNOSIS — R19.5 DARK STOOLS: ICD-10-CM

## 2024-01-08 LAB
BASOPHILS # BLD: 0 K/UL (ref 0–0.2)
BASOPHILS NFR BLD: 1 % (ref 0–2)
DIFFERENTIAL METHOD BLD: ABNORMAL
EOSINOPHIL # BLD: 0.1 K/UL (ref 0–0.8)
EOSINOPHIL NFR BLD: 2 % (ref 0.5–7.8)
ERYTHROCYTE [DISTWIDTH] IN BLOOD BY AUTOMATED COUNT: 14.1 % (ref 11.9–14.6)
HCT VFR BLD AUTO: 39.2 % (ref 41.1–50.3)
HGB BLD-MCNC: 13 G/DL (ref 13.6–17.2)
IMM GRANULOCYTES # BLD AUTO: 0 K/UL (ref 0–0.5)
IMM GRANULOCYTES NFR BLD AUTO: 0 % (ref 0–5)
LYMPHOCYTES # BLD: 1.8 K/UL (ref 0.5–4.6)
LYMPHOCYTES NFR BLD: 27 % (ref 13–44)
MCH RBC QN AUTO: 33.1 PG (ref 26.1–32.9)
MCHC RBC AUTO-ENTMCNC: 33.2 G/DL (ref 31.4–35)
MCV RBC AUTO: 99.7 FL (ref 82–102)
MONOCYTES # BLD: 0.8 K/UL (ref 0.1–1.3)
MONOCYTES NFR BLD: 12 % (ref 4–12)
NEUTS SEG # BLD: 3.8 K/UL (ref 1.7–8.2)
NEUTS SEG NFR BLD: 58 % (ref 43–78)
NRBC # BLD: 0 K/UL (ref 0–0.2)
PLATELET # BLD AUTO: 163 K/UL (ref 150–450)
PMV BLD AUTO: 11.2 FL (ref 9.4–12.3)
RBC # BLD AUTO: 3.93 M/UL (ref 4.23–5.6)
WBC # BLD AUTO: 6.4 K/UL (ref 4.3–11.1)

## 2024-01-08 PROCEDURE — 1123F ACP DISCUSS/DSCN MKR DOCD: CPT | Performed by: NURSE PRACTITIONER

## 2024-01-08 PROCEDURE — 1036F TOBACCO NON-USER: CPT | Performed by: NURSE PRACTITIONER

## 2024-01-08 PROCEDURE — G8417 CALC BMI ABV UP PARAM F/U: HCPCS | Performed by: NURSE PRACTITIONER

## 2024-01-08 PROCEDURE — G8484 FLU IMMUNIZE NO ADMIN: HCPCS | Performed by: NURSE PRACTITIONER

## 2024-01-08 PROCEDURE — 99213 OFFICE O/P EST LOW 20 MIN: CPT | Performed by: NURSE PRACTITIONER

## 2024-01-08 PROCEDURE — G8427 DOCREV CUR MEDS BY ELIG CLIN: HCPCS | Performed by: NURSE PRACTITIONER

## 2024-01-08 RX ORDER — BUTYROSPERMUM PARKII(SHEA BUTTER), SIMMONDSIA CHINENSIS (JOJOBA) SEED OIL, ALOE BARBADENSIS LEAF EXTRACT .01; 1; 3.5 G/100G; G/100G; G/100G
LIQUID TOPICAL
COMMUNITY
Start: 2023-09-01

## 2024-01-08 SDOH — ECONOMIC STABILITY: FOOD INSECURITY: WITHIN THE PAST 12 MONTHS, YOU WORRIED THAT YOUR FOOD WOULD RUN OUT BEFORE YOU GOT MONEY TO BUY MORE.: NEVER TRUE

## 2024-01-08 SDOH — ECONOMIC STABILITY: INCOME INSECURITY: HOW HARD IS IT FOR YOU TO PAY FOR THE VERY BASICS LIKE FOOD, HOUSING, MEDICAL CARE, AND HEATING?: NOT HARD AT ALL

## 2024-01-08 SDOH — ECONOMIC STABILITY: FOOD INSECURITY: WITHIN THE PAST 12 MONTHS, THE FOOD YOU BOUGHT JUST DIDN'T LAST AND YOU DIDN'T HAVE MONEY TO GET MORE.: NEVER TRUE

## 2024-01-08 ASSESSMENT — ENCOUNTER SYMPTOMS
SHORTNESS OF BREATH: 1
DIARRHEA: 1

## 2024-01-08 NOTE — TELEPHONE ENCOUNTER
Spoke with daughter Miah. She was frustrated that she wasn't told about the oxygen but was told about the bipap ST.  Her dad is using the new machine and oxygen but his events are high.     He has used 4 different masks recently including the F&P Edwin which is the mask he used the night of the study. His AHI on bipap ST was 6/hr.     His daughter has noticed that he is more sleepy during the day and is falling to sleep in his chair during the day. In the mornings, his speech is mumbled.  He was seen by pcp today who ordered labs and a head CT.     He is using the F&P Simplus mask. She will change his mask back to F&P Edwin and see if this makes a difference with his mask leak. He uses a chin strap with the full face mask.     Will order OLVIN on bipap + oxygen to monitor oxygen levels. We may need to decrease pressure to help with leak, however, his mask leak has been high on auto pap as well.     Order OLVIN.   Try F&P Edwin mask.     Keep follow up in March            Orders Placed This Encounter   Procedures    Pulse oximetry, overnight     Scheduling Instructions:      DME: brendan      Please send out Overnight Oximetry on biPAP + oxygen therapy       Please Fax results to: [phone]         JESSI Santoro - RAYMOND

## 2024-01-08 NOTE — PROGRESS NOTES
[facility] [place] [address]  Phone: [phone] Fax [phone]  Chrissy Munoz, [facility] [place]           Vic Nunez (: 1931) presents today c/o  he has had diarrhea for 3 days, he was taking OTC antidiarrhea medication which has helped some. Change in his breathing deep gasp like he is SOB while at rest. He is on 2LNC oxygen at night along with his bipap, this was done by pulmonary. His oxygen saturation today is 100%  Bipap events are occurring 59.6 in January   Daughter states he is having stroke like symptoms at night and she notices more in the mornings. She states she is noticing speech impairment, gait has changed, his distance of abmulating is weaker. This has been occurring over a month     Chief Complaint   Patient presents with    Other     Possible strokes at night, due to changes in a lot of issues, verbal, hearing, interests, breathing pattern, etc    Diarrhea     Issue for 3 days, mildly dehydrated,      Patient Active Problem List   Diagnosis    Difficulty with CPAP full face mask use    NSVT (nonsustained ventricular tachycardia) (HCC)    [place] (hard of hearing)    Hyperlipidemia    Essential hypertension    Thoracic aortic aneurysm without rupture (HCC)    Carotid artery disease (HCC)    DEMI (obstructive sleep apnea)    Paroxysmal atrial fibrillation (HCC)    Asbestos exposure    TIA (transient ischemic attack)    Arthritis of knee    Benign prostatic hyperplasia without lower urinary tract symptoms    Abnormal chest x-ray    Current mild episode of major depressive disorder without prior episode (HCC)        Reviewed and updated this visit by provider:  Meds           Review of Systems   Constitutional:  Positive for fatigue. Negative for chills and fever.   HENT: Negative.     Respiratory:  Positive for shortness of breath.         Bipap, states he is confused in the mornings   Cardiovascular: Negative.    Gastrointestinal:

## 2024-01-09 NOTE — RESULT ENCOUNTER NOTE
Please inform patient that his RBC were low at 3.93, his last lab it was 3.99 so not much of a drop but normal is 4.23-5.6 , his hemoglobin is 13.0 and normal is 13.6-17.2. He is to continue eating high iron foods as listed below are high iron foods:  Cereals, fortified   Cream of Wheat   Kidney, beef  Liver, beef  Liver, calf  Liver, chicken   Liverwurst  Nuts 1 cup Prune juice   Seeds, sunflower

## 2024-01-10 ENCOUNTER — OFFICE VISIT (OUTPATIENT)
Age: 89
End: 2024-01-10
Payer: MEDICARE

## 2024-01-10 VITALS
WEIGHT: 207 LBS | HEART RATE: 88 BPM | DIASTOLIC BLOOD PRESSURE: 82 MMHG | BODY MASS INDEX: 33.27 KG/M2 | HEIGHT: 66 IN | SYSTOLIC BLOOD PRESSURE: 138 MMHG

## 2024-01-10 DIAGNOSIS — I47.29 NSVT (NONSUSTAINED VENTRICULAR TACHYCARDIA) (HCC): ICD-10-CM

## 2024-01-10 DIAGNOSIS — I71.21 ANEURYSM OF ASCENDING AORTA WITHOUT RUPTURE (HCC): ICD-10-CM

## 2024-01-10 DIAGNOSIS — I65.23 BILATERAL CAROTID ARTERY STENOSIS: ICD-10-CM

## 2024-01-10 DIAGNOSIS — I48.0 PAROXYSMAL ATRIAL FIBRILLATION (HCC): Primary | ICD-10-CM

## 2024-01-10 DIAGNOSIS — I10 ESSENTIAL HYPERTENSION: ICD-10-CM

## 2024-01-10 PROCEDURE — G8417 CALC BMI ABV UP PARAM F/U: HCPCS | Performed by: INTERNAL MEDICINE

## 2024-01-10 PROCEDURE — 99214 OFFICE O/P EST MOD 30 MIN: CPT | Performed by: INTERNAL MEDICINE

## 2024-01-10 PROCEDURE — G8484 FLU IMMUNIZE NO ADMIN: HCPCS | Performed by: INTERNAL MEDICINE

## 2024-01-10 PROCEDURE — 1123F ACP DISCUSS/DSCN MKR DOCD: CPT | Performed by: INTERNAL MEDICINE

## 2024-01-10 PROCEDURE — G8427 DOCREV CUR MEDS BY ELIG CLIN: HCPCS | Performed by: INTERNAL MEDICINE

## 2024-01-10 PROCEDURE — 1036F TOBACCO NON-USER: CPT | Performed by: INTERNAL MEDICINE

## 2024-01-10 RX ORDER — FUROSEMIDE 20 MG/1
20 TABLET ORAL DAILY
Qty: 60 TABLET | Refills: 3 | Status: SHIPPED | OUTPATIENT
Start: 2024-01-10

## 2024-01-10 RX ORDER — LISINOPRIL 40 MG/1
40 TABLET ORAL DAILY
Qty: 90 TABLET | Refills: 3 | Status: SHIPPED | OUTPATIENT
Start: 2024-01-10

## 2024-01-10 NOTE — PROGRESS NOTES
MI    Hypertension Mother     Hypertension Father     Diabetes Father      Social History     Tobacco Use    Smoking status: Never    Smokeless tobacco: Never   Substance Use Topics    Alcohol use: Not Currently         ROS:    No obvious pertinent positives on review of systems except for what was outlined in the HPI today.      PHYSICAL EXAM:     /82   Pulse 88   Ht 1.676 m (5' 6\")   Wt 93.9 kg (207 lb)   BMI 33.41 kg/m²    General/Constitutional:   Alert and oriented x 3, no acute distress  HEENT:   normocephalic, atraumatic, moist mucous membranes  Neck:   No JVD or carotid bruits bilaterally  Cardiovascular:   regular rate and rhythm, no murmur/rub/gallop appreciated  Pulmonary:   clear to auscultation bilaterally, no respiratory distress  Abdomen:   soft, non-tender, non-distended  Ext:  mod LE edema.   Skin:  warm and dry, no obvious rashes seen  Neuro:   no obvious sensory or motor deficits  Psychiatric:   normal mood and affect      Lab Results   Component Value Date/Time     11/29/2023 09:00 AM    K 4.0 11/29/2023 09:00 AM     11/29/2023 09:00 AM    CO2 27 11/29/2023 09:00 AM    BUN 19 11/29/2023 09:00 AM    CREATININE 1.10 11/29/2023 09:00 AM    GLUCOSE 93 11/29/2023 09:00 AM    CALCIUM 9.3 11/29/2023 09:00 AM        Lab Results   Component Value Date    WBC 6.4 01/08/2024    HGB 13.0 (L) 01/08/2024    HCT 39.2 (L) 01/08/2024    MCV 99.7 01/08/2024     01/08/2024       Lab Results   Component Value Date    TSH 1.910 01/08/2021       Lab Results   Component Value Date    LABA1C 5.5 01/08/2021     Lab Results   Component Value Date     01/08/2021       Lab Results   Component Value Date    CHOL 152 11/29/2023    CHOL 78 11/25/2022    CHOL 119 01/09/2021     Lab Results   Component Value Date    TRIG 37 11/29/2023    TRIG 26 (L) 11/25/2022    TRIG 35 01/09/2021     Lab Results   Component Value Date    HDL 68 (H) 11/29/2023    HDL 61 (H) 11/25/2022    HDL 57 01/09/2021

## 2024-01-16 RX ORDER — FUROSEMIDE 20 MG/1
20 TABLET ORAL DAILY
Qty: 90 TABLET | Refills: 3 | Status: SHIPPED | OUTPATIENT
Start: 2024-01-16

## 2024-01-16 NOTE — TELEPHONE ENCOUNTER
Called pt's daughter states pharmacy advised her rx never received advised will resend into pharmacy. Advised to call back if still not to call back.

## 2024-01-16 NOTE — TELEPHONE ENCOUNTER
Pts daughter states that [facility] is having problems with the rx for the metoprolo and are needing a new order sent in pts daughter also wants to know if the lasixs were sent into [facility] as well